# Patient Record
Sex: FEMALE | Race: WHITE | NOT HISPANIC OR LATINO | Employment: FULL TIME | URBAN - METROPOLITAN AREA
[De-identification: names, ages, dates, MRNs, and addresses within clinical notes are randomized per-mention and may not be internally consistent; named-entity substitution may affect disease eponyms.]

---

## 2017-01-30 DIAGNOSIS — D50.9 IRON DEFICIENCY ANEMIA: ICD-10-CM

## 2017-02-10 ENCOUNTER — ALLSCRIPTS OFFICE VISIT (OUTPATIENT)
Dept: OTHER | Facility: OTHER | Age: 47
End: 2017-02-10

## 2017-02-14 ENCOUNTER — LAB CONVERSION - ENCOUNTER (OUTPATIENT)
Dept: OTHER | Facility: OTHER | Age: 47
End: 2017-02-14

## 2017-02-17 ENCOUNTER — LAB CONVERSION - ENCOUNTER (OUTPATIENT)
Dept: OTHER | Facility: OTHER | Age: 47
End: 2017-02-17

## 2017-02-17 LAB
CULTURE RESULT (HISTORICAL): NORMAL
CULTURE RESULT (HISTORICAL): NORMAL
FECAL LEUKOCYTES (HISTORICAL): NORMAL
SHIGA TOXIN TEST (HISTORICAL): NORMAL

## 2017-03-05 ENCOUNTER — GENERIC CONVERSION - ENCOUNTER (OUTPATIENT)
Dept: OTHER | Facility: OTHER | Age: 47
End: 2017-03-05

## 2017-04-15 ENCOUNTER — GENERIC CONVERSION - ENCOUNTER (OUTPATIENT)
Dept: OTHER | Facility: OTHER | Age: 47
End: 2017-04-15

## 2017-04-25 ENCOUNTER — GENERIC CONVERSION - ENCOUNTER (OUTPATIENT)
Dept: OTHER | Facility: OTHER | Age: 47
End: 2017-04-25

## 2017-05-09 ENCOUNTER — GENERIC CONVERSION - ENCOUNTER (OUTPATIENT)
Dept: OTHER | Facility: OTHER | Age: 47
End: 2017-05-09

## 2017-05-16 ENCOUNTER — GENERIC CONVERSION - ENCOUNTER (OUTPATIENT)
Dept: OTHER | Facility: OTHER | Age: 47
End: 2017-05-16

## 2017-05-19 ENCOUNTER — GENERIC CONVERSION - ENCOUNTER (OUTPATIENT)
Dept: OTHER | Facility: OTHER | Age: 47
End: 2017-05-19

## 2017-05-23 ENCOUNTER — GENERIC CONVERSION - ENCOUNTER (OUTPATIENT)
Dept: OTHER | Facility: OTHER | Age: 47
End: 2017-05-23

## 2017-07-27 ENCOUNTER — GENERIC CONVERSION - ENCOUNTER (OUTPATIENT)
Dept: OTHER | Facility: OTHER | Age: 47
End: 2017-07-27

## 2017-08-07 ENCOUNTER — GENERIC CONVERSION - ENCOUNTER (OUTPATIENT)
Dept: OTHER | Facility: OTHER | Age: 47
End: 2017-08-07

## 2017-08-23 ENCOUNTER — GENERIC CONVERSION - ENCOUNTER (OUTPATIENT)
Dept: OTHER | Facility: OTHER | Age: 47
End: 2017-08-23

## 2017-08-30 ENCOUNTER — GENERIC CONVERSION - ENCOUNTER (OUTPATIENT)
Dept: OTHER | Facility: OTHER | Age: 47
End: 2017-08-30

## 2017-09-06 ENCOUNTER — GENERIC CONVERSION - ENCOUNTER (OUTPATIENT)
Dept: OTHER | Facility: OTHER | Age: 47
End: 2017-09-06

## 2017-10-24 ENCOUNTER — GENERIC CONVERSION - ENCOUNTER (OUTPATIENT)
Dept: OTHER | Facility: OTHER | Age: 47
End: 2017-10-24

## 2017-11-08 ENCOUNTER — ALLSCRIPTS OFFICE VISIT (OUTPATIENT)
Dept: OTHER | Facility: OTHER | Age: 47
End: 2017-11-08

## 2017-11-09 NOTE — PROGRESS NOTES
Assessment    1  Conjunctivitis, bacterial (372 39,041 9) (H10 9)    Plan  Conjunctivitis, bacterial    · Tobramycin 0 3 % Ophthalmic Solution; INSTILL 1 DROP INTO AFFECTED EYE(S)4 TIMES DAILY    Discussion/Summary    Patient to start Tobramycin qid, for 5-7 days  RTO as needed or 6 months  Self Referrals: No      Chief Complaint  Pt here for right eye---pasted shut  Onset was last night  Granddaughter has same  no dep--frm ord  dk  since last here  History of Present Illness  HPI: 52year old white female c/o right eye lids pasted shut this morning, has green discharge from right eye, now left eye having similar sx  Grand daughter dx  with pink eye  Saturday son is getting   Vision slightly affected, but has no congestion  Review of Systems   Constitutional: no fever,-- not feeling poorly,-- no chills-- and-- not feeling tired  ENT: no earache,-- no sore throat-- and-- no nasal discharge  Respiratory: no shortness of breath-- and-- no cough  Active Problems  1  Fibromyalgia (729 1) (M79 7)   2  Hypertension (401 9) (I10)   3  Low vitamin D level (268 9) (E55 9)   4  History of No advance directives (V49 89) (Z78 9)   5  Non-seasonal allergic rhinitis due to other allergic trigger (477 8) (J30 89)    Surgical History  Surgical History Reviewed: The surgical history was reviewed and updated today  Social History     · Always uses seat belt   ·    · Former smoker (G75 98) (N17 105)   · Lives with parents   · History of No advance directives (V49 89) (Z78 9)   · No drug use   · Occasional alcohol consumption (V49 89) (Z78 9)  The social history was reviewed and updated today  The social history was reviewed and is unchanged  Family History  Family History Reviewed: The family history was reviewed and updated today  Current Meds   1  Benadryl 25 MG TABS; TAKE 2 TABLET Bedtime; Therapy: 95Jpo2368 to Recorded   2  CeleBREX 200 MG Oral Capsule; 1 BID;  Therapy: 27ALG5528 to Recorded   3  Cetirizine HCl - 10 MG Oral Tablet; Take 1 tablet daily; Therapy: 20OEV0323 to Recorded   4  Cyanocobalamin 1000 MCG/ML Injection Solution; INJECT 1  ML Intramuscular R deltoid monthly; Therapy: 49Ddi1118 to (Last Rx:18Ysv3412)  Requested for: 49Mkn7326 Ordered   5  Dicyclomine HCl - 20 MG Oral Tablet; TAKE 1 TABLET 4 TIMES DAILY; Therapy: 25PEY7712 to (Evaluate:12Mar2017)  Requested for: 53VSL3955; Last Rx:72Wzc6948 Ordered   6  DULoxetine HCl - 60 MG Oral Capsule Delayed Release Particles; TAKE 1 CAPSULE EVERY DAY; Therapy: 85ZYL4029 to (Evaluate:22Apr2018)  Requested for: 24Oct2017; Last Rx:67Pjc9609 Ordered   7  Fluticasone Propionate 50 MCG/ACT Nasal Suspension; USE 1 SPRAY IN EACH NOSTRIL TWICE A DAY FOR 7 DAYS; Therapy: 07HLN9404 to (Evaluate:12Jan2017)  Requested for: 29WIL3025; Last Rx:90Jhk0876 Ordered   8  Gabapentin 600 MG Oral Tablet; take 1 tablet by mouth every day; Therapy: 62Gii2575 to (Maria Isabel Salazar)  Requested for: 16XKQ9532; Last Rx:04Ext6599 Ordered   9  Lisinopril 10 MG Oral Tablet; take 1 tablet every day; Therapy: 93AIB6173 to (Evaluate:22Apr2018)  Requested for: 24Oct2017; Last UH:49KTC9687 Ordered   10  Metoprolol Succinate ER 50 MG Oral Tablet Extended Release 24 Hour; Take 1 tablet  daily; Therapy: 78NUO3592 to (Last Rx:24Oct2017)  Requested for: 24Oct2017 Ordered   11  Montelukast Sodium 10 MG Oral Tablet; take 1 tablet by mouth every day; Therapy: 76KKR0224 to ([id number] [id number])  Requested for: [phone]; Last  Rx:25Oct2017 Ordered   12  Vitamin D (Cholecalciferol) 400 UNIT Oral Tablet Chewable; CHEW 1 TABLET DAILY; Therapy: 96XCW0241 to (Last Rx:67Slw1431)  Requested for: 54Yao6034 Ordered    The medication list was reviewed and updated today  Allergies  1   Bactrim TABS    Vitals   Recorded: 36KZU5360 10:03AM   Temperature 97 F   Respiration 16   Systolic 349   Diastolic 88   Height 5 ft 3 in   Weight 208 lb    BMI Calculated 36 85   BSA Calculated 1 97       Physical Exam   Constitutional  General appearance: No acute distress, well appearing and well nourished  Eyes  Conjunctiva and lids: No swelling, erythema or discharge  Pupils and irises: Equal, round and reactive to light  Ears, Nose, Mouth, and Throat  External inspection of ears and nose: Normal    Otoscopic examination: Abnormal  -- TM erythematous with congestion noted in middle ears  Nasal mucosa, septum, and turbinates: Abnormal  -- Turbinates inflamed  Oropharynx: Normal with no erythema, edema, exudate or lesions  Pulmonary  Respiratory effort: No increased work of breathing or signs of respiratory distress  Auscultation of lungs: Clear to auscultation  Results/Data  PHQ-2 Adult Depression Screening 26UXI9592 10:05AM User, Ahs     Test Name Result Flag Reference   PHQ-2 Adult Depression Score 0       Over the last two weeks, how often have you been bothered by any of the following problems? Little interest or pleasure in doing things: Not at all - 0 Feeling down, depressed, or hopeless: Not at all - 0   PHQ-2 Adult Depression Screening Negative         Future Appointments    Date/Time Provider Specialty Site   12/07/2017 09:00 AM JORGE LUIS Amador   Internal Medicine [facility] INTERNAL MED       Signatures   Electronically signed by : Helena Lazcano [facility]; Nov 8 2017 11:06AM EST                       (Author)    Electronically signed by : JORGE LUIS Del Castillo ; Nov 8 2017 11:37AM EST

## 2017-12-07 ENCOUNTER — ALLSCRIPTS OFFICE VISIT (OUTPATIENT)
Dept: OTHER | Facility: OTHER | Age: 47
End: 2017-12-07

## 2017-12-08 NOTE — PROGRESS NOTES
Assessment    1  Pre-op examination (V72 84) (Z01 818)   2  Knee pain (719 46) (M25 569)   3  Hypertension (401 9) (I10)    Discussion/Summary  Surgical Clearance: She is at a LOW risk from a cardiovascular standpoint at this time without any additional cardiac testing  Reevaluation needed, if she should present with symptoms prior to surgery/procedure  Comments:  patient is cleared for the planned surgery  Labs were reviewed in office with patient and all are acceptable  to take metoprolol evening before the surgery and to take lisinopril in AM day of surgery  Possible side effects of new medications were reviewed with the patient/guardian today  The treatment plan was reviewed with the patient/guardian  The patient/guardian understands and agrees with the treatment plan     Self Referrals: Yes      Chief Complaint  Pt here for pre-op clearance  Had pre-op b/w done on 11/22/17 at [facility]  I will try to get  Having right total knee replacement on 12/20/17 by Dr Nicky Jacob at [facility]  Pt sees dr Steven Villatoro at [facility] and the phone # is [phone]  dk  dr Steven Villatoro since last here--[facility] ortho  History of Present Illness  Pre-Op Visit (Brief): The patient is being seen for a preoperative visit  Surgical Risk Assessment:  Prior Anesthesia: She had prior anesthesia,-- no prior adverse reaction to edidural anesthesia-- and-- no prior adverse reaction to general anesthesia  Review of Systems   Constitutional: No fever, no chills, feels well, no tiredness, no recent weight gain or weight loss  Eyes: No complaints of eye pain, no red eyes, no eyesight problems, no discharge, no dry eyes, no itching of eyes  ENT: no complaints of earache, no loss of hearing, no nose bleeds, no nasal discharge, no sore throat, no hoarseness  Cardiovascular: No complaints of slow heart rate, no fast heart rate, no chest pain, no palpitations, no leg claudication, no lower extremity edema  Respiratory: No complaints of shortness of breath, no wheezing, no cough, no SOB on exertion, no orthopnea, no PND  Gastrointestinal: No complaints of abdominal pain, no constipation, no nausea or vomiting, no diarrhea, no bloody stools  Genitourinary: No complaints of dysuria, no incontinence, no pelvic pain, no dysmenorrhea, no vaginal discharge or bleeding  Musculoskeletal: No complaints of arthralgias, no myalgias, no joint swelling or stiffness, no limb pain or swelling  Integumentary: No complaints of skin rash or lesions, no itching, no skin wounds, no breast pain or lump  Neurological: No complaints of headache, no confusion, no convulsions, no numbness, no dizziness or fainting, no tingling, no limb weakness, no difficulty walking  Psychiatric: Not suicidal, no sleep disturbance, no anxiety or depression, no change in personality, no emotional problems  Endocrine: No complaints of proptosis, no hot flashes, no muscle weakness, no deepening of the voice, no feelings of weakness  Hematologic/Lymphatic: No complaints of swollen glands, no swollen glands in the neck, does not bleed easily, does not bruise easily  Active Problems  1  Conjunctivitis, bacterial (372 39,041 9) (H10 9)   2  Fibromyalgia (729 1) (M79 7)   3  Hypertension (401 9) (I10)   4  Low vitamin D level (268 9) (E55 9)   5  History of No advance directives (V49 89) (Z78 9)   6   Non-seasonal allergic rhinitis due to other allergic trigger (477 8) (J66 89)    Past Medical History   · Denied: History of Mental health problem   · Denied: History of Substance abuse    Surgical History   · History of Arthroscopy Knee Right   · History of Cholecystectomy   · History of Gastric Surgery For Morbid Obesity Gastric Bypass   · History of Hysterectomy   · History of Inguinal Hernia Repair   · History of Primary Repair Of Knee Ligament Cruciate Anterior   · History of Pyeloplasty   · History of Rotator Cuff Repair    Family History  Mother    · Family history of diabetes mellitus (V18 0) (Z83 3)   · Family history of Hypertension, benign  Father    · Denied: Family history of Substance abuse-family    Social History     · Always uses seat belt   ·    · Former smoker (Z89 42) (A76 084)   · Lives with parents   · History of No advance directives (V49 89) (Z78 9)   · No drug use   · Occasional alcohol consumption (V49 89) (Z78 9)    Current Meds   1  Benadryl 25 MG TABS; TAKE 2 TABLET Bedtime; Therapy: 08Mqb8965 to Recorded   2  CeleBREX 200 MG Oral Capsule; 1 BID; Therapy: 27CFX8370 to Recorded   3  Cetirizine HCl - 10 MG Oral Tablet; Take 1 tablet daily; Therapy: 66CAU6008 to Recorded   4  Cyanocobalamin 1000 MCG/ML Injection Solution; INJECT 1  ML Intramuscular R deltoid monthly; Therapy: 38Xat1186 to (Last Rx:02Shd0464)  Requested for: 25Ywh7820 Ordered   5  Dicyclomine HCl - 20 MG Oral Tablet; TAKE 1 TABLET 4 TIMES DAILY; Therapy: 00ZSH1042 to (Evaluate:12Mar2017)  Requested for: 08GDI1074; Last Rx:45Jgn4160 Ordered   6  DULoxetine HCl - 60 MG Oral Capsule Delayed Release Particles; TAKE 1 CAPSULE EVERY DAY; Therapy: 74XBO4663 to (Evaluate:22Apr2018)  Requested for: 24Oct2017; Last Rx:24Oct2017 Ordered   7  Fluticasone Propionate 50 MCG/ACT Nasal Suspension; USE 1 SPRAY IN EACH NOSTRIL TWICE A DAY FOR 7 DAYS; Therapy: 11XTS5003 to (Evaluate:12Jan2017)  Requested for: 21GKD1628; Last Rx:13Nov2016 Ordered   8  Gabapentin 600 MG Oral Tablet; take 1 tablet by mouth every day; Therapy: 29Cyl8747 to (Mabel Matthew)  Requested for: 03JFF2201; Last Rx:09Oct2017 Ordered   9  Lisinopril 10 MG Oral Tablet; take 1 tablet every day; Therapy: 67DXP9784 to (Evaluate:22Apr2018)  Requested for: 38Wuw1647; Last SQ:14IRW3320 Ordered   10  Metoprolol Succinate ER 50 MG Oral Tablet Extended Release 24 Hour; Take 1 tablet  daily; Therapy: 28PDX8460 to (Last Rx:77Xwq0021)  Requested for: 24Oct2017 Ordered   11   Montelukast Sodium 10 MG Oral Tablet; take 1 tablet by mouth every day; Therapy: 59TDI1905 to (0487 [id number] 23 66)  Requested for: ; Last  Rx:25Oct2017 Ordered   12  Vitamin D (Cholecalciferol) 400 UNIT Oral Tablet Chewable; CHEW 1 TABLET DAILY; Therapy: 58Qeo1561 to (Last Rx:36Zhe8416)  Requested for: 03Cob0331 Ordered    Allergies  1  Bactrim TABS    Vitals   Recorded: 61Nit4133 08:44AM   Heart Rate 79, R DP   Respiration 16   Systolic 105, LUE, Sitting   Diastolic 78, LUE, Sitting   BP CUFF SIZE Large   Height 5 ft 3 in   Weight 210 lb    BMI Calculated 37 2   BSA Calculated 1 97       Physical Exam   Constitutional  General appearance: No acute distress, well appearing and well nourished  Eyes  Conjunctiva and lids: No swelling, erythema or discharge  Pupils and irises: Equal, round, reactive to light  Ophthalmoscopic examination: Normal fundi and optic discs  Ears, Nose, Mouth, and Throat  External inspection of ears and nose: Normal    Otoscopic examination: Tympanic membranes translucent with normal light reflex  Canals patent without erythema  Hearing: Normal    Nasal mucosa, septum, and turbinates: Normal without edema or erythema  Lips, teeth, and gums: Normal, good dentition  Oropharynx: Normal with no erythema, edema, exudate or lesions  Neck  Neck: Supple, symmetric, trachea midline, no masses  Thyroid: Normal, no thyromegaly  Pulmonary  Respiratory effort: No increased work of breathing or signs of respiratory distress  Percussion of chest: Normal    Palpation of chest: Normal    Auscultation of lungs: Clear to auscultation  Cardiovascular  Palpation of heart: Normal PMI, no thrills  Auscultation of heart: Normal rate and rhythm, normal S1 and S2, no murmurs  Carotid pulses: 2+ bilaterally  Abdominal aorta: Normal    Femoral pulses: 2+ bilaterally  Pedal pulses: 2+ bilaterally  Examination of extremities for edema and/or varicosities: Normal    Chest  Breasts: Normal, no dimpling or skin changes appreciated  Palpation of breasts and axillae: Normal, no masses palpated  Abdomen  Abdomen: Non-tender, no masses  Liver and spleen: No hepatomegaly or splenomegaly  Examination for hernias: No hernia appreciated  Anus, perineum, and rectum: Normal sphincter tone, no masses, no prolapse  Stool sample for occult blood: Negative  Genitourinary  External genitalia and vagina: Normal, no lesions appreciated  Urethra: Normal, no discharge  Bladder: Not distended, no tenderness  Cervix: Normal, no lesions  Uterus: Normal size, no tenderness, no masses  Adnexa/Parametria: Normal, no masses or tenderness  Lymphatic  Palpation of lymph nodes in neck: No lymphadenopathy  Palpation of lymph nodes in axillae: No lymphadenopathy  Palpation of lymph nodes in groin: No lymphadenopathy  Palpation of lymph nodes in other areas: No lymphadenopathy  Musculoskeletal  Gait and station: Normal    Digits and nails: Normal without clubbing or cyanosis  Joints, bones, and muscles: Normal    Range of motion: Normal    Stability: Normal    Muscle strength/tone: Normal    Skin  Skin and subcutaneous tissue: Normal without rashes or lesions  Palpation of skin and subcutaneous tissue: Normal turgor  Neurologic  Cranial nerves: Cranial nerves II-XII intact  Reflexes: 2+ and symmetric  Sensation: No sensory loss  Psychiatric  Judgment and insight: Normal    Orientation to person, place, and time: Normal    Recent and remote memory: Intact  Mood and affect: Normal        End of Encounter Meds    1  CeleBREX 200 MG Oral Capsule (Celecoxib); 1 BID; Therapy: 12RKJ0886 to Recorded   2  Gabapentin 600 MG Oral Tablet; take 1 tablet by mouth every day; Therapy: 60Gyd7420 to (Evaluate:89Cqu3215)  Requested for: 18UUZ8954; Last Rx:09Oct2017 Ordered    3  DULoxetine HCl - 60 MG Oral Capsule Delayed Release Particles; TAKE 1 CAPSULE EVERY DAY;  Therapy: 35SFE7720 to (Evaluate:22Apr2018)  Requested for: [phone]; Last Rx: 21QRR3121 Ordered    4  Lisinopril 10 MG Oral Tablet; take 1 tablet every day; Therapy: 62QNE9590 to (Evaluate:22Apr2018)  Requested for: 24Oct2017; Last LN:65VOC2520 Ordered   5  Metoprolol Succinate ER 50 MG Oral Tablet Extended Release 24 Hour; Take 1 tablet daily; Therapy: 31SYI8627 to (Last Rx:42Hng4795)  Requested for: 95Gng9455 Ordered    6  Vitamin D (Cholecalciferol) 400 UNIT Oral Tablet Chewable; CHEW 1 TABLET DAILY; Therapy: 68Mhw7500 to (Last Rx:31Cbo1256)  Requested for: 82Tbw6682 Ordered    7  Benadryl 25 MG TABS; TAKE 2 TABLET Bedtime; Therapy: 08LXW1152 to Recorded   8  Cetirizine HCl - 10 MG Oral Tablet; Take 1 tablet daily; Therapy: 12VWA3521 to Recorded   9  Montelukast Sodium 10 MG Oral Tablet (Singulair); take 1 tablet by mouth every day; Therapy: 27ASB1241 to ([phone])  Requested for: [phone]; Last SA:68FJE3897 Ordered    10  Fluticasone Propionate 50 MCG/ACT Nasal Suspension; USE 1 SPRAY IN EACH  NOSTRIL TWICE A DAY FOR 7 DAYS; Therapy: 72OBW9827 to (Evaluate:12Jan2017)  Requested for: 10SDP7741; Last  Rx:71Rck4602 Ordered    11  Dicyclomine HCl - 20 MG Oral Tablet; TAKE 1 TABLET 4 TIMES DAILY; Therapy: 55DLS9301 to (Evaluate:12Mar2017)  Requested for: 53HCW8069; Last  Rx:99Bip6846 Ordered    12  Cyanocobalamin 1000 MCG/ML Injection Solution; INJECT 1  ML Intramuscular R  deltoid monthly;   Therapy: 65JEL8099 to (Last Rx:67Sft4595)  Requested for: 70Qdm8099 Ordered    Signatures   Electronically signed by : JORGE LUIS Haskins ; Dec  7 2017  9:35AM EST                       (Author)

## 2017-12-20 ENCOUNTER — GENERIC CONVERSION - ENCOUNTER (OUTPATIENT)
Dept: FAMILY MEDICINE CLINIC | Facility: HOSPITAL | Age: 47
End: 2017-12-20

## 2018-01-08 ENCOUNTER — GENERIC CONVERSION - ENCOUNTER (OUTPATIENT)
Dept: FAMILY MEDICINE CLINIC | Facility: HOSPITAL | Age: 48
End: 2018-01-08

## 2018-01-14 VITALS
DIASTOLIC BLOOD PRESSURE: 82 MMHG | TEMPERATURE: 96.8 F | HEIGHT: 63 IN | BODY MASS INDEX: 33.49 KG/M2 | SYSTOLIC BLOOD PRESSURE: 132 MMHG | WEIGHT: 189 LBS

## 2018-01-15 VITALS
WEIGHT: 208 LBS | RESPIRATION RATE: 16 BRPM | TEMPERATURE: 97 F | DIASTOLIC BLOOD PRESSURE: 88 MMHG | BODY MASS INDEX: 36.86 KG/M2 | SYSTOLIC BLOOD PRESSURE: 158 MMHG | HEIGHT: 63 IN

## 2018-01-23 VITALS
HEART RATE: 79 BPM | SYSTOLIC BLOOD PRESSURE: 120 MMHG | RESPIRATION RATE: 16 BRPM | WEIGHT: 210 LBS | HEIGHT: 63 IN | BODY MASS INDEX: 37.21 KG/M2 | DIASTOLIC BLOOD PRESSURE: 78 MMHG

## 2018-01-23 NOTE — CONSULTS
Chief Complaint  Pt here for pre-op clearance  Had pre-op b/w done on 11/22/17 at [facility]  I will try to get  Having right total knee replacement on 12/20/17 by Dr Iliana Baez at [facility]  Pt sees dr Chidi Beckett at [facility] and the phone # is [phone]  dk     saw dr Chidi Beckett since last here--[facility]  History of Present Illness  Pre-Op Visit (Brief): The patient is being seen for a preoperative visit  Surgical Risk Assessment:   Prior Anesthesia: She had prior anesthesia, no prior adverse reaction to edidural anesthesia and no prior adverse reaction to general anesthesia  Review of Systems    Constitutional: No fever, no chills, feels well, no tiredness, no recent weight gain or weight loss  Eyes: No complaints of eye pain, no red eyes, no eyesight problems, no discharge, no dry eyes, no itching of eyes  ENT: no complaints of earache, no loss of hearing, no nose bleeds, no nasal discharge, no sore throat, no hoarseness  Cardiovascular: No complaints of slow heart rate, no fast heart rate, no chest pain, no palpitations, no leg claudication, no lower extremity edema  Respiratory: No complaints of shortness of breath, no wheezing, no cough, no SOB on exertion, no orthopnea, no PND  Gastrointestinal: No complaints of abdominal pain, no constipation, no nausea or vomiting, no diarrhea, no bloody stools  Genitourinary: No complaints of dysuria, no incontinence, no pelvic pain, no dysmenorrhea, no vaginal discharge or bleeding  Musculoskeletal: No complaints of arthralgias, no myalgias, no joint swelling or stiffness, no limb pain or swelling  Integumentary: No complaints of skin rash or lesions, no itching, no skin wounds, no breast pain or lump  Neurological: No complaints of headache, no confusion, no convulsions, no numbness, no dizziness or fainting, no tingling, no limb weakness, no difficulty walking     Psychiatric: Not suicidal, no sleep disturbance, no anxiety or depression, no change in personality, no emotional problems  Endocrine: No complaints of proptosis, no hot flashes, no muscle weakness, no deepening of the voice, no feelings of weakness  Hematologic/Lymphatic: No complaints of swollen glands, no swollen glands in the neck, does not bleed easily, does not bruise easily  Active Problems    1  Conjunctivitis, bacterial (372 39,041 9) (H10 9)   2  Fibromyalgia (729 1) (M79 7)   3  Hypertension (401 9) (I10)   4  Low vitamin D level (268 9) (E55 9)   5  History of No advance directives (V49 89) (Z78 9)   6  Non-seasonal allergic rhinitis due to other allergic trigger (477 8) (J30 89)    Past Medical History    · Denied: History of Mental health problem   · Denied: History of Substance abuse    Surgical History    · History of Arthroscopy Knee Right   · History of Cholecystectomy   · History of Gastric Surgery For Morbid Obesity Gastric Bypass   · History of Hysterectomy   · History of Inguinal Hernia Repair   · History of Primary Repair Of Knee Ligament Cruciate Anterior   · History of Pyeloplasty   · History of Rotator Cuff Repair    Family History    · Family history of diabetes mellitus (V18 0) (Z83 3)   · Family history of Hypertension, benign    · Denied: Family history of Substance abuse-family    Social History    · Always uses seat belt   ·    · Former smoker (V15 82) (U92 433)   · Lives with parents   · History of No advance directives (V49 89) (Z78 9)   · No drug use   · Occasional alcohol consumption (V49 89) (Z78 9)    Current Meds   1  Benadryl 25 MG TABS; TAKE 2 TABLET Bedtime; Therapy: 41Jip9448 to Recorded   2  CeleBREX 200 MG Oral Capsule; 1 BID; Therapy: 34EBI2475 to Recorded   3  Cetirizine HCl - 10 MG Oral Tablet; Take 1 tablet daily; Therapy: 17EFQ8825 to Recorded   4  Cyanocobalamin 1000 MCG/ML Injection Solution; INJECT 1  ML Intramuscular R deltoid   monthly;    Therapy: 47Lsq1050 to (Last Rx:19Sep2016) Requested for: 93BNW5654 Ordered   5  Dicyclomine HCl - 20 MG Oral Tablet; TAKE 1 TABLET 4 TIMES DAILY; Therapy: 82YNS2466 to (Evaluate:12Mar2017)  Requested for: 24TWP0972; Last   Rx:27Beb8602 Ordered   6  DULoxetine HCl - 60 MG Oral Capsule Delayed Release Particles; TAKE 1 CAPSULE   EVERY DAY; Therapy: 27CID0827 to (Evaluate:22Apr2018)  Requested for: 46Ztx6518; Last   Rx:60Eif6196 Ordered   7  Fluticasone Propionate 50 MCG/ACT Nasal Suspension; USE 1 SPRAY IN EACH   NOSTRIL TWICE A DAY FOR 7 DAYS; Therapy: 41QRA9456 to (Evaluate:12Jan2017)  Requested for: 76NUS8396; Last   Rx:18Lmu2884 Ordered   8  Gabapentin 600 MG Oral Tablet; take 1 tablet by mouth every day; Therapy: 88Czi4276 to (Devante Goldstein)  Requested for: 13SVY5813; Last   Rx:13Cmg8428 Ordered   9  Lisinopril 10 MG Oral Tablet; take 1 tablet every day; Therapy: 95YAT9762 to (Evaluate:22Apr2018)  Requested for: 24Oct2017; Last   UX:06QOO8395 Ordered   10  Metoprolol Succinate ER 50 MG Oral Tablet Extended Release 24 Hour; Take 1 tablet    daily; Therapy: 26WSP8120 to (Last Rx:84Emr0603)  Requested for: 24Oct2017 Ordered   11  Montelukast Sodium 10 MG Oral Tablet; take 1 tablet by mouth every day; Therapy: 31WBG7881 to (Melony Borges)  Requested for: ; Last    Rx:28Ouw1439 Ordered   12  Vitamin D (Cholecalciferol) 400 UNIT Oral Tablet Chewable; CHEW 1 TABLET DAILY; Therapy: 76Jki4636 to (Last Rx:95Kut1216)  Requested for: 27Suf8339 Ordered    Allergies    1  Bactrim TABS    Vitals  Signs    Heart Rate: 79, R DP  Respiration: 16  Systolic: 227, LUE, Sitting  Diastolic: 78, LUE, Sitting  BP Cuff Size: Large  Height: 5 ft 3 in  Weight: 210 lb   BMI Calculated: 37 2  BSA Calculated: 1 97    Physical Exam    Constitutional   General appearance: No acute distress, well appearing and well nourished  Eyes   Conjunctiva and lids: No swelling, erythema or discharge  Pupils and irises: Equal, round, reactive to light  Ophthalmoscopic examination: Normal fundi and optic discs  Ears, Nose, Mouth, and Throat   External inspection of ears and nose: Normal     Otoscopic examination: Tympanic membranes translucent with normal light reflex  Canals patent without erythema  Hearing: Normal     Nasal mucosa, septum, and turbinates: Normal without edema or erythema  Lips, teeth, and gums: Normal, good dentition  Oropharynx: Normal with no erythema, edema, exudate or lesions  Neck   Neck: Supple, symmetric, trachea midline, no masses  Thyroid: Normal, no thyromegaly  Pulmonary   Respiratory effort: No increased work of breathing or signs of respiratory distress  Percussion of chest: Normal     Palpation of chest: Normal     Auscultation of lungs: Clear to auscultation  Cardiovascular   Palpation of heart: Normal PMI, no thrills  Auscultation of heart: Normal rate and rhythm, normal S1 and S2, no murmurs  Carotid pulses: 2+ bilaterally  Abdominal aorta: Normal     Femoral pulses: 2+ bilaterally  Pedal pulses: 2+ bilaterally  Examination of extremities for edema and/or varicosities: Normal     Chest   Breasts: Normal, no dimpling or skin changes appreciated  Palpation of breasts and axillae: Normal, no masses palpated  Abdomen   Abdomen: Non-tender, no masses  Liver and spleen: No hepatomegaly or splenomegaly  Examination for hernias: No hernia appreciated  Anus, perineum, and rectum: Normal sphincter tone, no masses, no prolapse  Stool sample for occult blood: Negative  Genitourinary   External genitalia and vagina: Normal, no lesions appreciated  Urethra: Normal, no discharge  Bladder: Not distended, no tenderness  Cervix: Normal, no lesions  Uterus: Normal size, no tenderness, no masses  Adnexa/Parametria: Normal, no masses or tenderness  Lymphatic   Palpation of lymph nodes in neck: No lymphadenopathy      Palpation of lymph nodes in axillae: No lymphadenopathy  Palpation of lymph nodes in groin: No lymphadenopathy  Palpation of lymph nodes in other areas: No lymphadenopathy  Musculoskeletal   Gait and station: Normal     Digits and nails: Normal without clubbing or cyanosis  Joints, bones, and muscles: Normal     Range of motion: Normal     Stability: Normal     Muscle strength/tone: Normal     Skin   Skin and subcutaneous tissue: Normal without rashes or lesions  Palpation of skin and subcutaneous tissue: Normal turgor  Neurologic   Cranial nerves: Cranial nerves II-XII intact  Reflexes: 2+ and symmetric  Sensation: No sensory loss  Psychiatric   Judgment and insight: Normal     Orientation to person, place, and time: Normal     Recent and remote memory: Intact  Mood and affect: Normal        Assessment    1  Pre-op examination (V72 84) (Z01 818)   2  Knee pain (719 46) (M25 569)   3  Hypertension (401 9) (I10)    Discussion/Summary  Surgical Clearance: She is at a LOW risk from a cardiovascular standpoint at this time without any additional cardiac testing  Reevaluation needed, if she should present with symptoms prior to surgery/procedure  Comments:  patient is cleared for the planned surgery  Labs were reviewed in office with patient and all are acceptable    advised to take metoprolol evening before the surgery and to take lisinopril in AM day of surgery  Possible side effects of new medications were reviewed with the patient/guardian today  The treatment plan was reviewed with the patient/guardian  The patient/guardian understands and agrees with the treatment plan     Self Referrals: Yes      End of Encounter Meds    1  CeleBREX 200 MG Oral Capsule (Celecoxib); 1 BID; Therapy: 44TMN6090 to Recorded   2  Gabapentin 600 MG Oral Tablet; take 1 tablet by mouth every day; Therapy: 68Gyl9564 to (Evaluate:43Sfa9040)  Requested for: 51PVD1053; Last   Rx:09Oct2017 Ordered    3   DULoxetine HCl - 60 MG Oral Capsule Delayed Release Particles; TAKE 1 CAPSULE   EVERY DAY; Therapy: 98UQI4331 to (Evaluate:22Apr2018)  Requested for: 24Oct2017; Last   Rx:24Oct2017 Ordered    4  Lisinopril 10 MG Oral Tablet; take 1 tablet every day; Therapy: 33JSR4905 to (Evaluate:22Apr2018)  Requested for: 24Oct2017; Last   IS:97GUO4854 Ordered   5  Metoprolol Succinate ER 50 MG Oral Tablet Extended Release 24 Hour; Take 1 tablet   daily; Therapy: 15GRW5275 to (Last Rx:24Oct2017)  Requested for: 24Oct2017 Ordered    6  Vitamin D (Cholecalciferol) 400 UNIT Oral Tablet Chewable; CHEW 1 TABLET DAILY; Therapy: 42Akp6693 to (Last Rx:95Hlb0996)  Requested for: 17Djz4904 Ordered    7  Benadryl 25 MG TABS; TAKE 2 TABLET Bedtime; Therapy: 22CKS2708 to Recorded   8  Cetirizine HCl - 10 MG Oral Tablet; Take 1 tablet daily; Therapy: 97XQK1941 to Recorded   9  Montelukast Sodium 10 MG Oral Tablet (Singulair); take 1 tablet by mouth every day; Therapy: 62XFN1072 to (Jessika Patience)  Requested for: [phone]; Last   OZ:38ZUD8948 Ordered    10  Fluticasone Propionate 50 MCG/ACT Nasal Suspension; USE 1 SPRAY IN EACH    NOSTRIL TWICE A DAY FOR 7 DAYS; Therapy: 71GJH6313 to (Evaluate:12Jan2017)  Requested for: 03MDT5121; Last    Rx:08Uik5721 Ordered    11  Dicyclomine HCl - 20 MG Oral Tablet; TAKE 1 TABLET 4 TIMES DAILY; Therapy: 37WGY4149 to (Evaluate:12Mar2017)  Requested for: 77SWM7985; Last    Rx:30Vgb8986 Ordered    12  Cyanocobalamin 1000 MCG/ML Injection Solution; INJECT 1  ML Intramuscular R deltoid    monthly;     Therapy: 10IEP3053 to (Last Rx:91Zqo7763)  Requested for: 73Ldi2624 Ordered    Signatures   Electronically signed by : JORGE LUIS Vigil ; Dec  7 2017  9:35AM EST                       (Author)

## 2018-04-18 PROBLEM — Z91.09 ENVIRONMENTAL ALLERGIES: Status: ACTIVE | Noted: 2018-04-18

## 2018-04-18 RX ORDER — LISINOPRIL 10 MG/1
1 TABLET ORAL DAILY
COMMUNITY
Start: 2016-09-07 | End: 2018-08-03 | Stop reason: SDUPTHER

## 2018-04-18 RX ORDER — MONTELUKAST SODIUM 10 MG/1
1 TABLET ORAL DAILY
COMMUNITY
Start: 2016-09-07 | End: 2018-04-28 | Stop reason: SDUPTHER

## 2018-04-18 RX ORDER — METOPROLOL SUCCINATE 50 MG/1
1 TABLET, EXTENDED RELEASE ORAL DAILY
COMMUNITY
Start: 2016-09-07 | End: 2018-05-21 | Stop reason: SDUPTHER

## 2018-04-19 ENCOUNTER — OFFICE VISIT (OUTPATIENT)
Dept: FAMILY MEDICINE CLINIC | Facility: HOSPITAL | Age: 48
End: 2018-04-19
Payer: COMMERCIAL

## 2018-04-19 VITALS
DIASTOLIC BLOOD PRESSURE: 80 MMHG | OXYGEN SATURATION: 98 % | WEIGHT: 218 LBS | BODY MASS INDEX: 38.62 KG/M2 | HEIGHT: 63 IN | HEART RATE: 83 BPM | SYSTOLIC BLOOD PRESSURE: 136 MMHG

## 2018-04-19 DIAGNOSIS — M79.7 FIBROMYALGIA: ICD-10-CM

## 2018-04-19 DIAGNOSIS — M54.2 NECK PAIN: ICD-10-CM

## 2018-04-19 DIAGNOSIS — R53.82 CHRONIC FATIGUE: ICD-10-CM

## 2018-04-19 DIAGNOSIS — Z00.00 HEALTH CARE MAINTENANCE: ICD-10-CM

## 2018-04-19 DIAGNOSIS — Z91.09 ENVIRONMENTAL ALLERGIES: ICD-10-CM

## 2018-04-19 DIAGNOSIS — I10 ESSENTIAL HYPERTENSION: Primary | ICD-10-CM

## 2018-04-19 PROCEDURE — 99214 OFFICE O/P EST MOD 30 MIN: CPT | Performed by: INTERNAL MEDICINE

## 2018-04-19 RX ORDER — CELECOXIB 200 MG/1
200 CAPSULE ORAL EVERY EVENING
COMMUNITY
Start: 2018-04-12 | End: 2019-10-29

## 2018-04-19 RX ORDER — GABAPENTIN 100 MG/1
100 CAPSULE ORAL
Qty: 30 CAPSULE | Refills: 4 | Status: SHIPPED | OUTPATIENT
Start: 2018-04-19 | End: 2018-06-07 | Stop reason: SDUPTHER

## 2018-04-19 RX ORDER — GABAPENTIN 300 MG/1
300 CAPSULE ORAL
Qty: 60 CAPSULE | Refills: 0 | Status: SHIPPED | OUTPATIENT
Start: 2018-04-19 | End: 2018-07-05 | Stop reason: ALTCHOICE

## 2018-04-19 RX ORDER — DULOXETIN HYDROCHLORIDE 60 MG/1
60 CAPSULE, DELAYED RELEASE ORAL
COMMUNITY
Start: 2012-02-16 | End: 2018-05-21 | Stop reason: SDUPTHER

## 2018-04-19 RX ORDER — GABAPENTIN 600 MG/1
TABLET ORAL
COMMUNITY
Start: 2018-01-24 | End: 2018-04-19 | Stop reason: CLARIF

## 2018-04-19 RX ORDER — DULOXETIN HYDROCHLORIDE 30 MG/1
30 CAPSULE, DELAYED RELEASE ORAL DAILY
Qty: 30 CAPSULE | Refills: 5 | Status: SHIPPED | OUTPATIENT
Start: 2018-04-19 | End: 2018-10-08 | Stop reason: SDUPTHER

## 2018-04-19 NOTE — PROGRESS NOTES
Assessment/Plan:    Hypertension  Stable on meds    Environmental allergies  On singulair    Neck pain  Present for 1 month or so  Feels a lump under the neck  Also c/o a lot of fatigue    Fibromyalgia  Sx are not a problem right now,  Wants to discuss taper of gabapentin    Patient Active Problem List   Diagnosis    Fibromyalgia    Hypertension    Environmental allergies    Neck pain     Orders Placed This Encounter   Procedures    Mammo diagnostic bilateral w cad    US thyroid    Comprehensive metabolic panel    Lipid Panel with Direct LDL reflex    TSH, 3rd generation            Diagnoses and all orders for this visit:    Essential hypertension  -     Lipid Panel with Direct LDL reflex; Future    Neck pain  -     gabapentin (NEURONTIN) 300 mg capsule; Take 1 capsule (300 mg total) by mouth daily at bedtime Take with 100 mg tablet as instructed  -     gabapentin (NEURONTIN) 100 mg capsule; Take 1 capsule (100 mg total) by mouth daily at bedtime Take with 300 mg as instructed  -     US thyroid; Future    Environmental allergies    Fibromyalgia  -     DULoxetine (CYMBALTA) 30 mg delayed release capsule; Take 1 capsule (30 mg total) by mouth daily for 30 days With 60 mg capsule for total dose of 90 mg    Chronic fatigue  -     Comprehensive metabolic panel; Future  -     TSH, 3rd generation; Future    Health care maintenance  -     Mammo diagnostic bilateral w cad; Future    Other orders  -     lisinopril (ZESTRIL) 10 mg tablet; Take 1 tablet by mouth daily  -     metoprolol succinate (TOPROL-XL) 50 mg 24 hr tablet; Take 1 tablet by mouth daily  -     montelukast (SINGULAIR) 10 mg tablet; Take 1 tablet by mouth daily  -     celecoxib (CeleBREX) 200 mg capsule;   -     DULoxetine (CYMBALTA) 60 mg delayed release capsule; Take 60 mg by mouth  -     Discontinue: gabapentin (NEURONTIN) 600 MG tablet;   -     ERGOCALCIFEROL IM;           Subjective:      Patient ID: Amarilys Ramirez is a 52 y o  female      Follow up of chronic as described and several new problems  Lump under neck, worsening depression, fatigue,  Swallow problem           The following portions of the patient's history were reviewed and updated as appropriate: allergies, current medications, past family history, past medical history, past social history, past surgical history and problem list     Review of Systems   Constitutional: Positive for fatigue  Objective:      Current Outpatient Prescriptions:     celecoxib (CeleBREX) 200 mg capsule, , Disp: , Rfl:     DULoxetine (CYMBALTA) 60 mg delayed release capsule, Take 60 mg by mouth, Disp: , Rfl:     ERGOCALCIFEROL IM, , Disp: , Rfl:     lisinopril (ZESTRIL) 10 mg tablet, Take 1 tablet by mouth daily, Disp: , Rfl:     metoprolol succinate (TOPROL-XL) 50 mg 24 hr tablet, Take 1 tablet by mouth daily, Disp: , Rfl:     montelukast (SINGULAIR) 10 mg tablet, Take 1 tablet by mouth daily, Disp: , Rfl:     DULoxetine (CYMBALTA) 30 mg delayed release capsule, Take 1 capsule (30 mg total) by mouth daily for 30 days With 60 mg capsule for total dose of 90 mg, Disp: 30 capsule, Rfl: 5    gabapentin (NEURONTIN) 100 mg capsule, Take 1 capsule (100 mg total) by mouth daily at bedtime Take with 300 mg as instructed , Disp: 30 capsule, Rfl: 4    gabapentin (NEURONTIN) 300 mg capsule, Take 1 capsule (300 mg total) by mouth daily at bedtime Take with 100 mg tablet as instructed, Disp: 60 capsule, Rfl: 0     Physical Exam   Constitutional: She is oriented to person, place, and time  She appears well-developed and well-nourished  Eyes: Pupils are equal, round, and reactive to light  Neck: Normal range of motion  Neck supple  No thyromegaly present  Cardiovascular: Normal rate, regular rhythm, normal heart sounds and intact distal pulses  No murmur heard  Pulmonary/Chest: Effort normal and breath sounds normal  She has no wheezes  She has no rales  Abdominal: Soft   Bowel sounds are normal  There is no tenderness  Musculoskeletal: Normal range of motion  She exhibits no edema, tenderness or deformity  Lymphadenopathy:     She has no cervical adenopathy  Neurological: She is alert and oriented to person, place, and time  She has normal reflexes  Skin: Skin is warm and dry  Psychiatric: She has a normal mood and affect  Vitals reviewed

## 2018-04-19 NOTE — PATIENT INSTRUCTIONS
Taper of gabapentin,  400 mg for 1 month,  300 mg for 1 mo , then 200 mg for 1 month, 100 mg for 1 month, then stop  Increase cymbalta to 90 mg daily  Labs and mammo ordered to be done as early as possible    US of thyroid    You were seen for a follow up of chronic medical problems today  Be sure to make any changes to your medication as discussed by your doctor  If blood tests or other testing was ordered, be sure to obtain this at the time requested by the doctor  Our office will call you with the results of your tests once they arrive in our office

## 2018-04-20 DIAGNOSIS — G62.9 NEUROPATHY: Primary | ICD-10-CM

## 2018-04-20 RX ORDER — GABAPENTIN 600 MG/1
TABLET ORAL
Qty: 30 TABLET | Refills: 3 | Status: SHIPPED | OUTPATIENT
Start: 2018-04-20 | End: 2018-06-07 | Stop reason: CLARIF

## 2018-04-25 ENCOUNTER — TELEPHONE (OUTPATIENT)
Dept: FAMILY MEDICINE CLINIC | Facility: HOSPITAL | Age: 48
End: 2018-04-25

## 2018-04-25 ENCOUNTER — HOSPITAL ENCOUNTER (OUTPATIENT)
Dept: ULTRASOUND IMAGING | Facility: HOSPITAL | Age: 48
Discharge: HOME | End: 2018-04-25
Attending: INTERNAL MEDICINE
Payer: COMMERCIAL

## 2018-04-25 DIAGNOSIS — M54.2 NECK PAIN: ICD-10-CM

## 2018-04-25 LAB
ALBUMIN SERPL-MCNC: 4.2 G/DL (ref 3.6–5.1)
ALBUMIN/GLOB SERPL: 1.4 (CALC) (ref 1–2.5)
ALP SERPL-CCNC: 140 U/L (ref 33–115)
ALT SERPL-CCNC: 17 U/L (ref 6–29)
AST SERPL-CCNC: 22 U/L (ref 10–35)
BILIRUB SERPL-MCNC: 0.6 MG/DL (ref 0.2–1.2)
BUN SERPL-MCNC: 12 MG/DL (ref 7–25)
BUN/CREAT SERPL: ABNORMAL (CALC) (ref 6–22)
CALCIUM SERPL-MCNC: 9.6 MG/DL (ref 8.6–10.2)
CHLORIDE SERPL-SCNC: 100 MMOL/L (ref 98–110)
CHOLEST SERPL-MCNC: 195 MG/DL
CHOLEST/HDLC SERPL: 2.8 (CALC)
CO2 SERPL-SCNC: 31 MMOL/L (ref 20–31)
CREAT SERPL-MCNC: 0.78 MG/DL (ref 0.5–1.1)
GLOBULIN SER CALC-MCNC: 2.9 G/DL (CALC) (ref 1.9–3.7)
GLUCOSE SERPL-MCNC: 95 MG/DL (ref 65–99)
HDLC SERPL-MCNC: 69 MG/DL
LDLC SERPL CALC-MCNC: 102 MG/DL (CALC)
NONHDLC SERPL-MCNC: 126 MG/DL (CALC)
POTASSIUM SERPL-SCNC: 4.7 MMOL/L (ref 3.5–5.3)
PROT SERPL-MCNC: 7.1 G/DL (ref 6.1–8.1)
SL AMB EGFR AFRICAN AMERICAN: 105 ML/MIN/1.73M2
SL AMB EGFR NON AFRICAN AMERICAN: 91 ML/MIN/1.73M2
SODIUM SERPL-SCNC: 136 MMOL/L (ref 135–146)
TRIGL SERPL-MCNC: 143 MG/DL
TSH SERPL-ACNC: 1.56 MIU/L

## 2018-04-25 PROCEDURE — 76536 US EXAM OF HEAD AND NECK: CPT

## 2018-04-26 NOTE — PROGRESS NOTES
I am not sure whether patient was notified of my message from yesterday    Please be sure she gets the message

## 2018-04-27 ENCOUNTER — TELEPHONE (OUTPATIENT)
Dept: FAMILY MEDICINE CLINIC | Facility: HOSPITAL | Age: 48
End: 2018-04-27

## 2018-04-27 DIAGNOSIS — R59.0 ENLARGED LYMPH NODE IN NECK: Primary | ICD-10-CM

## 2018-04-27 NOTE — TELEPHONE ENCOUNTER
Left a detailed message for pt her labs are normal however I asked that she call me back to go over the results of CT DD      ----- Message from Baltazar Mccall MD sent at 4/25/2018 10:09 AM EDT -----  Labs are normal   Call pt  To inform

## 2018-04-27 NOTE — TELEPHONE ENCOUNTER
Pt called back she is aware of results and will f/u with CT of neck I put order in system DD      ----- Message from Kate Amaral MD sent at 4/25/2018  4:34 PM EDT -----  Call pt  No evidence of thyroid nodule but there is a large lymph node  Radiology recommending a CT of neck to get a better evaluation    Please order this

## 2018-04-28 DIAGNOSIS — Z88.9 H/O MULTIPLE ALLERGIES: Primary | ICD-10-CM

## 2018-04-28 RX ORDER — MONTELUKAST SODIUM 10 MG/1
TABLET ORAL
Qty: 30 TABLET | Refills: 3 | Status: SHIPPED | OUTPATIENT
Start: 2018-04-28 | End: 2018-08-23 | Stop reason: SDUPTHER

## 2018-05-01 ENCOUNTER — TELEPHONE (OUTPATIENT)
Dept: FAMILY MEDICINE CLINIC | Facility: HOSPITAL | Age: 48
End: 2018-05-01

## 2018-05-01 DIAGNOSIS — Z12.39 SCREENING FOR BREAST CANCER: Primary | ICD-10-CM

## 2018-05-03 ENCOUNTER — HOSPITAL ENCOUNTER (OUTPATIENT)
Dept: CT IMAGING | Facility: HOSPITAL | Age: 48
Discharge: HOME | End: 2018-05-03
Attending: INTERNAL MEDICINE
Payer: COMMERCIAL

## 2018-05-03 ENCOUNTER — HOSPITAL ENCOUNTER (OUTPATIENT)
Dept: MAMMOGRAPHY | Facility: CLINIC | Age: 48
Discharge: HOME | End: 2018-05-03
Payer: COMMERCIAL

## 2018-05-03 DIAGNOSIS — Z12.39 SCREENING FOR BREAST CANCER: ICD-10-CM

## 2018-05-03 DIAGNOSIS — R22.1 MASS IN NECK: Primary | ICD-10-CM

## 2018-05-03 DIAGNOSIS — R59.0 ENLARGED LYMPH NODE IN NECK: ICD-10-CM

## 2018-05-03 PROCEDURE — 77067 SCR MAMMO BI INCL CAD: CPT

## 2018-05-03 PROCEDURE — 77063 BREAST TOMOSYNTHESIS BI: CPT

## 2018-05-03 PROCEDURE — 70491 CT SOFT TISSUE NECK W/DYE: CPT

## 2018-05-03 RX ADMIN — IOHEXOL 85 ML: 350 INJECTION, SOLUTION INTRAVENOUS at 07:41

## 2018-05-08 ENCOUNTER — TELEPHONE (OUTPATIENT)
Dept: FAMILY MEDICINE CLINIC | Facility: HOSPITAL | Age: 48
End: 2018-05-08

## 2018-05-08 NOTE — TELEPHONE ENCOUNTER
No, actually what I told her was that I would send a message to his office to be sure they were aware of her referral   I can not control their schedule and have no power other than doing that  When did they schedule the appointment for her?

## 2018-05-08 NOTE — TELEPHONE ENCOUNTER
I reviewed her films and will schedule ASAP for Malgorzata  Thanks  For future reference, my cell # is  should you need it

## 2018-05-09 NOTE — TELEPHONE ENCOUNTER
Thank you very much  Janine Stoner, please contact patient and inform that Dr Aundria Osgood will be in touch

## 2018-05-09 NOTE — TELEPHONE ENCOUNTER
CONTACTED PATIENT SHE IS AWARE, AND DR Kirsty Santos OFFICE ALREADY CALLED HER ABOUT APPT WITH THEM

## 2018-05-21 DIAGNOSIS — Z00.00 HEALTH CARE MAINTENANCE: Primary | ICD-10-CM

## 2018-05-21 RX ORDER — DULOXETIN HYDROCHLORIDE 60 MG/1
CAPSULE, DELAYED RELEASE ORAL
Qty: 90 CAPSULE | Refills: 1 | Status: SHIPPED | OUTPATIENT
Start: 2018-05-21 | End: 2018-11-15 | Stop reason: SDUPTHER

## 2018-05-21 RX ORDER — METOPROLOL SUCCINATE 50 MG/1
TABLET, EXTENDED RELEASE ORAL
Qty: 90 TABLET | Refills: 1 | Status: SHIPPED | OUTPATIENT
Start: 2018-05-21 | End: 2018-11-15 | Stop reason: SDUPTHER

## 2018-06-06 PROBLEM — J38.7 LARYNGEAL MASS: Status: ACTIVE | Noted: 2018-06-06

## 2018-06-06 NOTE — ASSESSMENT & PLAN NOTE
Is to see ENT for eval and probable bx of mass seen on US of neck  Was seen and Rx for PPI given to try for one month, then f/u with possible bx done

## 2018-06-06 NOTE — PROGRESS NOTES
Assessment/Plan:    Laryngeal mass  Is to see ENT for eval and probable bx of mass seen on US of neck    Hypertension  Stable, no changes    Patient Active Problem List   Diagnosis    Fibromyalgia    Hypertension    Environmental allergies    Neck pain    Laryngeal mass     No orders of the defined types were placed in this encounter  Diagnoses and all orders for this visit:    Laryngeal mass    Essential hypertension    Neck pain  -     gabapentin (NEURONTIN) 100 mg capsule; Take 1 capsule (100 mg total) by mouth daily at bedtime for 30 days Take 2 capsules at bedtime, then taper to 1 capsule at bedtime as tolerated    Other orders  -     Cyanocobalamin 1000 MCG/ML KIT; cyanocobalamin (vit B-12) 1,000 mcg/mL injection solution  -     ergocalciferol (VITAMIN D2) 50,000 units; ergocalciferol (vitamin D2) 50,000 unit capsule    -------1 cap weekly  -     omeprazole (PriLOSEC) 40 MG capsule; 1 in the am  By malcolm wilson md   -     ranitidine (ZANTAC) 300 MG tablet; 1 at bedtime by dr Anushka Amin           Subjective:      Patient ID: Sreekanth Kim is a 50 y o  female  Here for follow up of labs and abnormal study,  Chronic conditions        The following portions of the patient's history were reviewed and updated as appropriate: allergies, current medications, past family history, past medical history, past social history, past surgical history and problem list     Review of Systems   Constitutional: Negative for fatigue and fever  HENT: Negative for hearing loss  Eyes: Negative for visual disturbance  Respiratory: Negative for cough, chest tightness, shortness of breath and wheezing  Cardiovascular: Negative for chest pain, palpitations and leg swelling  Gastrointestinal: Negative for abdominal pain, diarrhea and nausea  Genitourinary: Negative for dysuria and hematuria  Musculoskeletal: Negative for arthralgias  Neurological: Negative for dizziness, numbness and headaches  Psychiatric/Behavioral: Negative for confusion and dysphoric mood  All other systems reviewed and are negative  Objective:      Current Outpatient Prescriptions:     celecoxib (CeleBREX) 200 mg capsule, , Disp: , Rfl:     DULoxetine (CYMBALTA) 30 mg delayed release capsule, Take 1 capsule (30 mg total) by mouth daily for 30 days With 60 mg capsule for total dose of 90 mg, Disp: 30 capsule, Rfl: 5    DULoxetine (CYMBALTA) 60 mg delayed release capsule, TAKE 1 CAPSULE EVERY DAY, Disp: 90 capsule, Rfl: 1    ERGOCALCIFEROL IM, , Disp: , Rfl:     gabapentin (NEURONTIN) 100 mg capsule, Take 1 capsule (100 mg total) by mouth daily at bedtime Take with 300 mg as instructed , Disp: 30 capsule, Rfl: 4    gabapentin (NEURONTIN) 300 mg capsule, Take 1 capsule (300 mg total) by mouth daily at bedtime Take with 100 mg tablet as instructed, Disp: 60 capsule, Rfl: 0    gabapentin (NEURONTIN) 600 MG tablet, TAKE 1 TABLET BY MOUTH EVERY DAY, Disp: 30 tablet, Rfl: 3    lisinopril (ZESTRIL) 10 mg tablet, Take 1 tablet by mouth daily, Disp: , Rfl:     metoprolol succinate (TOPROL-XL) 50 mg 24 hr tablet, TAKE 1 TABLET BY MOUTH EVERY DAY, Disp: 90 tablet, Rfl: 1    montelukast (SINGULAIR) 10 mg tablet, TAKE 1 TABLET BY MOUTH EVERY DAY, Disp: 30 tablet, Rfl: 3     Physical Exam   Constitutional: She is oriented to person, place, and time  She appears well-developed and well-nourished  Eyes: Pupils are equal, round, and reactive to light  Neck: Normal range of motion  Neck supple  No thyromegaly present  Cardiovascular: Normal rate, regular rhythm, normal heart sounds and intact distal pulses  No murmur heard  Pulmonary/Chest: Effort normal and breath sounds normal  She has no wheezes  She has no rales  Abdominal: Soft  Bowel sounds are normal  There is no tenderness  Musculoskeletal: Normal range of motion  She exhibits no edema, tenderness or deformity     Lymphadenopathy:     She has no cervical adenopathy  Neurological: She is alert and oriented to person, place, and time  She has normal reflexes  Skin: Skin is warm and dry  Psychiatric: She has a normal mood and affect  Vitals reviewed

## 2018-06-07 ENCOUNTER — OFFICE VISIT (OUTPATIENT)
Dept: FAMILY MEDICINE CLINIC | Facility: HOSPITAL | Age: 48
End: 2018-06-07
Payer: COMMERCIAL

## 2018-06-07 VITALS
SYSTOLIC BLOOD PRESSURE: 120 MMHG | RESPIRATION RATE: 16 BRPM | HEART RATE: 80 BPM | DIASTOLIC BLOOD PRESSURE: 78 MMHG | BODY MASS INDEX: 38.45 KG/M2 | HEIGHT: 63 IN | WEIGHT: 217 LBS

## 2018-06-07 DIAGNOSIS — J38.7 LARYNGEAL MASS: Primary | ICD-10-CM

## 2018-06-07 DIAGNOSIS — M54.2 NECK PAIN: ICD-10-CM

## 2018-06-07 DIAGNOSIS — I10 ESSENTIAL HYPERTENSION: ICD-10-CM

## 2018-06-07 PROCEDURE — 3078F DIAST BP <80 MM HG: CPT | Performed by: INTERNAL MEDICINE

## 2018-06-07 PROCEDURE — 99214 OFFICE O/P EST MOD 30 MIN: CPT | Performed by: INTERNAL MEDICINE

## 2018-06-07 PROCEDURE — 3008F BODY MASS INDEX DOCD: CPT | Performed by: INTERNAL MEDICINE

## 2018-06-07 PROCEDURE — 3074F SYST BP LT 130 MM HG: CPT | Performed by: INTERNAL MEDICINE

## 2018-06-07 RX ORDER — OMEPRAZOLE 40 MG/1
40 CAPSULE, DELAYED RELEASE ORAL DAILY
COMMUNITY
Start: 2018-06-05 | End: 2020-02-11 | Stop reason: ALTCHOICE

## 2018-06-07 RX ORDER — GABAPENTIN 100 MG/1
100 CAPSULE ORAL
Qty: 90 CAPSULE | Refills: 0 | Status: SHIPPED | OUTPATIENT
Start: 2018-06-07 | End: 2019-08-01 | Stop reason: ALTCHOICE

## 2018-06-07 RX ORDER — RANITIDINE 300 MG/1
300 TABLET ORAL DAILY
COMMUNITY
Start: 2018-06-05 | End: 2019-08-01 | Stop reason: ALTCHOICE

## 2018-06-07 RX ORDER — ERGOCALCIFEROL 1.25 MG/1
CAPSULE ORAL
COMMUNITY
End: 2019-08-01 | Stop reason: ALTCHOICE

## 2018-06-07 NOTE — PROGRESS NOTES
Assessment/Plan:    Laryngeal mass  Is to see ENT for eval and probable bx of mass seen on US of neck  Was seen and Rx for PPI given to try for one month, then f/u with possible bx done  Hypertension  Stable, no changes    Neck pain  wweaning off gabapentin and doing OK    Patient Active Problem List   Diagnosis    Fibromyalgia    Hypertension    Environmental allergies    Neck pain    Laryngeal mass     No orders of the defined types were placed in this encounter  Diagnoses and all orders for this visit:    Laryngeal mass    Essential hypertension    Neck pain  -     gabapentin (NEURONTIN) 100 mg capsule; Take 1 capsule (100 mg total) by mouth daily at bedtime for 30 days Take 2 capsules at bedtime, then taper to 1 capsule at bedtime as tolerated    Other orders  -     Cyanocobalamin 1000 MCG/ML KIT; cyanocobalamin (vit B-12) 1,000 mcg/mL injection solution  -     ergocalciferol (VITAMIN D2) 50,000 units; ergocalciferol (vitamin D2) 50,000 unit capsule    -------1 cap weekly  -     omeprazole (PriLOSEC) 40 MG capsule; 1 in the am  By malcolm wilson md   -     ranitidine (ZANTAC) 300 MG tablet; 1 at bedtime by dr Glenna Mcnally           Subjective:      Patient ID: Amarilys Ramirez is a 50 y o  female  Here for f/u  Weaning off gabapentin, no worsening of neck pain but some sleep trouble  Laryngeal mass seen on US is being worked up by ENT and has f/u visit scheduled    BP is good  The following portions of the patient's history were reviewed and updated as appropriate: allergies, current medications, past family history, past medical history, past social history, past surgical history and problem list     Review of Systems   Constitutional: Negative for fatigue and fever  HENT: Negative for hearing loss  Eyes: Negative for visual disturbance  Respiratory: Negative for cough, chest tightness, shortness of breath and wheezing      Cardiovascular: Negative for chest pain, palpitations and leg swelling  Gastrointestinal: Negative for abdominal pain, diarrhea and nausea  Genitourinary: Negative for dysuria and hematuria  Musculoskeletal: Negative for arthralgias  Neurological: Negative for dizziness, numbness and headaches  Psychiatric/Behavioral: Negative for confusion and dysphoric mood  All other systems reviewed and are negative  Objective:      Current Outpatient Prescriptions:     celecoxib (CeleBREX) 200 mg capsule, 1 cap bid , Disp: , Rfl:     Cyanocobalamin 1000 MCG/ML KIT, cyanocobalamin (vit B-12) 1,000 mcg/mL injection solution, Disp: , Rfl:     DULoxetine (CYMBALTA) 30 mg delayed release capsule, Take 1 capsule (30 mg total) by mouth daily for 30 days With 60 mg capsule for total dose of 90 mg, Disp: 30 capsule, Rfl: 5    DULoxetine (CYMBALTA) 60 mg delayed release capsule, TAKE 1 CAPSULE EVERY DAY, Disp: 90 capsule, Rfl: 1    ergocalciferol (VITAMIN D2) 50,000 units, ergocalciferol (vitamin D2) 50,000 unit capsule    -------1 cap weekly, Disp: , Rfl:     gabapentin (NEURONTIN) 100 mg capsule, Take 1 capsule (100 mg total) by mouth daily at bedtime for 30 days Take 2 capsules at bedtime, then taper to 1 capsule at bedtime as tolerated, Disp: 90 capsule, Rfl: 0    gabapentin (NEURONTIN) 300 mg capsule, Take 1 capsule (300 mg total) by mouth daily at bedtime Take with 100 mg tablet as instructed (Patient taking differently: Takes 1 tab at bedtime     (Pt is weaning off of this med) ), Disp: 60 capsule, Rfl: 0    lisinopril (ZESTRIL) 10 mg tablet, Take 1 tablet by mouth daily, Disp: , Rfl:     metoprolol succinate (TOPROL-XL) 50 mg 24 hr tablet, TAKE 1 TABLET BY MOUTH EVERY DAY, Disp: 90 tablet, Rfl: 1    montelukast (SINGULAIR) 10 mg tablet, TAKE 1 TABLET BY MOUTH EVERY DAY, Disp: 30 tablet, Rfl: 3    omeprazole (PriLOSEC) 40 MG capsule, 1 in the am  By malcolm wilson md , Disp: , Rfl:     ranitidine (ZANTAC) 300 MG tablet, 1 at bedtime by dr Lashawn Villeda , Disp: , Rfl:      Physical Exam   Constitutional: She is oriented to person, place, and time  She appears well-developed and well-nourished  Eyes: Pupils are equal, round, and reactive to light  Neck: Normal range of motion  Neck supple  No thyromegaly present  Cardiovascular: Normal rate, regular rhythm, normal heart sounds and intact distal pulses  No murmur heard  Pulmonary/Chest: Effort normal and breath sounds normal  She has no wheezes  She has no rales  Abdominal: Soft  Bowel sounds are normal  There is no tenderness  Musculoskeletal: Normal range of motion  She exhibits no edema, tenderness or deformity  Lymphadenopathy:     She has no cervical adenopathy  Neurological: She is alert and oriented to person, place, and time  She has normal reflexes  Skin: Skin is warm and dry  Psychiatric: She has a normal mood and affect  Nursing note and vitals reviewed

## 2018-06-25 ENCOUNTER — TRANSCRIBE ORDERS (OUTPATIENT)
Dept: ADMINISTRATIVE | Facility: HOSPITAL | Age: 48
End: 2018-06-25

## 2018-06-25 ENCOUNTER — APPOINTMENT (OUTPATIENT)
Dept: LAB | Facility: HOSPITAL | Age: 48
End: 2018-06-25
Attending: OTOLARYNGOLOGY
Payer: COMMERCIAL

## 2018-06-25 ENCOUNTER — HOSPITAL ENCOUNTER (OUTPATIENT)
Dept: NON INVASIVE DIAGNOSTICS | Facility: HOSPITAL | Age: 48
Discharge: HOME | End: 2018-06-25
Attending: OTOLARYNGOLOGY
Payer: COMMERCIAL

## 2018-06-25 DIAGNOSIS — J35.1 LINGUAL TONSIL HYPERTROPHY: ICD-10-CM

## 2018-06-25 DIAGNOSIS — J35.1 LINGUAL TONSIL HYPERTROPHY: Primary | ICD-10-CM

## 2018-06-25 LAB
ANION GAP SERPL CALCULATED.3IONS-SCNC: 5 MMOL/L (ref 4–13)
BASOPHILS # BLD AUTO: 0.02 THOUSANDS/ΜL (ref 0–0.1)
BASOPHILS NFR BLD AUTO: 0 % (ref 0–1)
BUN SERPL-MCNC: 9 MG/DL (ref 5–25)
CALCIUM SERPL-MCNC: 8.7 MG/DL (ref 8.3–10.1)
CHLORIDE SERPL-SCNC: 103 MMOL/L (ref 100–108)
CO2 SERPL-SCNC: 29 MMOL/L (ref 21–32)
CREAT SERPL-MCNC: 0.87 MG/DL (ref 0.6–1.3)
EOSINOPHIL # BLD AUTO: 0.18 THOUSAND/ΜL (ref 0–0.61)
EOSINOPHIL NFR BLD AUTO: 3 % (ref 0–6)
ERYTHROCYTE [DISTWIDTH] IN BLOOD BY AUTOMATED COUNT: 13.1 % (ref 11.6–15.1)
GFR SERPL CREATININE-BSD FRML MDRD: 79 ML/MIN/1.73SQ M
GLUCOSE SERPL-MCNC: 128 MG/DL (ref 65–140)
HCT VFR BLD AUTO: 42.9 % (ref 34.8–46.1)
HGB BLD-MCNC: 14.2 G/DL (ref 11.5–15.4)
IMM GRANULOCYTES # BLD AUTO: 0.05 THOUSAND/UL (ref 0–0.2)
IMM GRANULOCYTES NFR BLD AUTO: 1 % (ref 0–2)
LYMPHOCYTES # BLD AUTO: 1.41 THOUSANDS/ΜL (ref 0.6–4.47)
LYMPHOCYTES NFR BLD AUTO: 25 % (ref 14–44)
MCH RBC QN AUTO: 30.9 PG (ref 26.8–34.3)
MCHC RBC AUTO-ENTMCNC: 33.1 G/DL (ref 31.4–37.4)
MCV RBC AUTO: 94 FL (ref 82–98)
MONOCYTES # BLD AUTO: 0.41 THOUSAND/ΜL (ref 0.17–1.22)
MONOCYTES NFR BLD AUTO: 7 % (ref 4–12)
NEUTROPHILS # BLD AUTO: 3.61 THOUSANDS/ΜL (ref 1.85–7.62)
NEUTS SEG NFR BLD AUTO: 64 % (ref 43–75)
PLATELET # BLD AUTO: 364 THOUSANDS/UL (ref 149–390)
PMV BLD AUTO: 10.8 FL (ref 8.9–12.7)
POTASSIUM SERPL-SCNC: 4.9 MMOL/L (ref 3.5–5.3)
RBC # BLD AUTO: 4.59 MILLION/UL (ref 3.81–5.12)
SODIUM SERPL-SCNC: 137 MMOL/L (ref 136–145)
WBC # BLD AUTO: 5.68 THOUSAND/UL (ref 4.31–10.16)

## 2018-06-25 PROCEDURE — 85025 COMPLETE CBC W/AUTO DIFF WBC: CPT

## 2018-06-25 PROCEDURE — 93005 ELECTROCARDIOGRAM TRACING: CPT

## 2018-06-25 PROCEDURE — 36415 COLL VENOUS BLD VENIPUNCTURE: CPT

## 2018-06-25 PROCEDURE — 80048 BASIC METABOLIC PNL TOTAL CA: CPT

## 2018-06-26 LAB
ATRIAL RATE: 74 BPM
P AXIS: 24 DEGREES
PR INTERVAL: 160 MS
QRS AXIS: 33 DEGREES
QRSD INTERVAL: 74 MS
QT INTERVAL: 398 MS
QTC INTERVAL: 441 MS
T WAVE AXIS: 33 DEGREES
VENTRICULAR RATE: 74 BPM

## 2018-06-26 PROCEDURE — 93010 ELECTROCARDIOGRAM REPORT: CPT | Performed by: INTERNAL MEDICINE

## 2018-07-05 NOTE — PRE-PROCEDURE INSTRUCTIONS
Pre-Surgery Instructions:   Medication Instructions    celecoxib (CeleBREX) 200 mg capsule Patient was instructed by Physician and understands   Cyanocobalamin 1000 MCG/ML KIT Instructed patient per Anesthesia Guidelines   DULoxetine (CYMBALTA) 30 mg delayed release capsule Instructed patient per Anesthesia Guidelines   DULoxetine (CYMBALTA) 60 mg delayed release capsule Instructed patient per Anesthesia Guidelines   ergocalciferol (VITAMIN D2) 50,000 units Instructed patient per Anesthesia Guidelines   gabapentin (NEURONTIN) 100 mg capsule Instructed patient per Anesthesia Guidelines   lisinopril (ZESTRIL) 10 mg tablet Instructed patient per Anesthesia Guidelines   metoprolol succinate (TOPROL-XL) 50 mg 24 hr tablet Instructed patient per Anesthesia Guidelines   montelukast (SINGULAIR) 10 mg tablet Instructed patient per Anesthesia Guidelines   omeprazole (PriLOSEC) 40 MG capsule Instructed patient per Anesthesia Guidelines   ranitidine (ZANTAC) 300 MG tablet Instructed patient per Anesthesia Guidelines  Pre-procedure instructions given without any questions or concerns at this time, pt will shower the evening prior and the morning of surgery with dial antibacterial soap

## 2018-07-11 ENCOUNTER — ANESTHESIA EVENT (OUTPATIENT)
Dept: PERIOP | Facility: HOSPITAL | Age: 48
End: 2018-07-11
Payer: COMMERCIAL

## 2018-07-12 ENCOUNTER — ANESTHESIA (OUTPATIENT)
Dept: PERIOP | Facility: HOSPITAL | Age: 48
End: 2018-07-12
Payer: COMMERCIAL

## 2018-07-12 ENCOUNTER — HOSPITAL ENCOUNTER (OUTPATIENT)
Facility: HOSPITAL | Age: 48
Setting detail: OUTPATIENT SURGERY
Discharge: HOME | End: 2018-07-12
Attending: OTOLARYNGOLOGY | Admitting: OTOLARYNGOLOGY
Payer: COMMERCIAL

## 2018-07-12 VITALS
SYSTOLIC BLOOD PRESSURE: 150 MMHG | DIASTOLIC BLOOD PRESSURE: 83 MMHG | HEART RATE: 88 BPM | RESPIRATION RATE: 32 BRPM | TEMPERATURE: 98.1 F | BODY MASS INDEX: 37.92 KG/M2 | HEIGHT: 63 IN | WEIGHT: 214 LBS | OXYGEN SATURATION: 94 %

## 2018-07-12 DIAGNOSIS — M54.2 NECK PAIN: Primary | ICD-10-CM

## 2018-07-12 DIAGNOSIS — J35.1 HYPERTROPHY OF TONSILS: ICD-10-CM

## 2018-07-12 PROCEDURE — 88189 FLOWCYTOMETRY/READ 16 & >: CPT | Performed by: PATHOLOGY

## 2018-07-12 PROCEDURE — 88309 TISSUE EXAM BY PATHOLOGIST: CPT | Performed by: PATHOLOGY

## 2018-07-12 PROCEDURE — 88185 FLOWCYTOMETRY/TC ADD-ON: CPT | Performed by: ANESTHESIOLOGY

## 2018-07-12 PROCEDURE — 88184 FLOWCYTOMETRY/ TC 1 MARKER: CPT | Performed by: OTOLARYNGOLOGY

## 2018-07-12 RX ORDER — FENTANYL CITRATE 50 UG/ML
INJECTION, SOLUTION INTRAMUSCULAR; INTRAVENOUS AS NEEDED
Status: DISCONTINUED | OUTPATIENT
Start: 2018-07-12 | End: 2018-07-12 | Stop reason: SURG

## 2018-07-12 RX ORDER — ONDANSETRON 2 MG/ML
INJECTION INTRAMUSCULAR; INTRAVENOUS AS NEEDED
Status: DISCONTINUED | OUTPATIENT
Start: 2018-07-12 | End: 2018-07-12 | Stop reason: SURG

## 2018-07-12 RX ORDER — HYDROCODONE BITARTRATE AND ACETAMINOPHEN 5; 325 MG/1; MG/1
2 TABLET ORAL EVERY 6 HOURS PRN
Qty: 15 TABLET | Refills: 0 | Status: SHIPPED | OUTPATIENT
Start: 2018-07-12 | End: 2018-07-22

## 2018-07-12 RX ORDER — METOCLOPRAMIDE HYDROCHLORIDE 5 MG/ML
10 INJECTION INTRAMUSCULAR; INTRAVENOUS ONCE AS NEEDED
Status: DISCONTINUED | OUTPATIENT
Start: 2018-07-12 | End: 2018-07-12 | Stop reason: HOSPADM

## 2018-07-12 RX ORDER — MIDAZOLAM HYDROCHLORIDE 1 MG/ML
INJECTION INTRAMUSCULAR; INTRAVENOUS AS NEEDED
Status: DISCONTINUED | OUTPATIENT
Start: 2018-07-12 | End: 2018-07-12 | Stop reason: SURG

## 2018-07-12 RX ORDER — PROPOFOL 10 MG/ML
INJECTION, EMULSION INTRAVENOUS AS NEEDED
Status: DISCONTINUED | OUTPATIENT
Start: 2018-07-12 | End: 2018-07-12 | Stop reason: SURG

## 2018-07-12 RX ORDER — FENTANYL CITRATE/PF 50 MCG/ML
25 SYRINGE (ML) INJECTION
Status: DISCONTINUED | OUTPATIENT
Start: 2018-07-12 | End: 2018-07-12 | Stop reason: HOSPADM

## 2018-07-12 RX ORDER — ROCURONIUM BROMIDE 10 MG/ML
INJECTION, SOLUTION INTRAVENOUS AS NEEDED
Status: DISCONTINUED | OUTPATIENT
Start: 2018-07-12 | End: 2018-07-12 | Stop reason: SURG

## 2018-07-12 RX ORDER — GLYCOPYRROLATE 0.2 MG/ML
INJECTION INTRAMUSCULAR; INTRAVENOUS AS NEEDED
Status: DISCONTINUED | OUTPATIENT
Start: 2018-07-12 | End: 2018-07-12 | Stop reason: SURG

## 2018-07-12 RX ORDER — IBUPROFEN 600 MG/1
600 TABLET ORAL EVERY 6 HOURS PRN
Status: DISCONTINUED | OUTPATIENT
Start: 2018-07-12 | End: 2018-07-12 | Stop reason: HOSPADM

## 2018-07-12 RX ORDER — SODIUM CHLORIDE, SODIUM LACTATE, POTASSIUM CHLORIDE, CALCIUM CHLORIDE 600; 310; 30; 20 MG/100ML; MG/100ML; MG/100ML; MG/100ML
75 INJECTION, SOLUTION INTRAVENOUS CONTINUOUS
Status: DISCONTINUED | OUTPATIENT
Start: 2018-07-12 | End: 2018-07-12 | Stop reason: HOSPADM

## 2018-07-12 RX ORDER — HYDROCODONE BITARTRATE AND ACETAMINOPHEN 5; 325 MG/1; MG/1
2 TABLET ORAL EVERY 6 HOURS PRN
Status: DISCONTINUED | OUTPATIENT
Start: 2018-07-12 | End: 2018-07-12 | Stop reason: HOSPADM

## 2018-07-12 RX ADMIN — FENTANYL CITRATE 100 MCG: 50 INJECTION, SOLUTION INTRAMUSCULAR; INTRAVENOUS at 15:30

## 2018-07-12 RX ADMIN — ROCURONIUM BROMIDE 40 MG: 10 SOLUTION INTRAVENOUS at 15:30

## 2018-07-12 RX ADMIN — ONDANSETRON 4 MG: 2 INJECTION INTRAMUSCULAR; INTRAVENOUS at 16:00

## 2018-07-12 RX ADMIN — FENTANYL CITRATE 25 MCG: 50 INJECTION, SOLUTION INTRAMUSCULAR; INTRAVENOUS at 16:44

## 2018-07-12 RX ADMIN — CEFAZOLIN SODIUM 2000 MG: 1 SOLUTION INTRAVENOUS at 15:43

## 2018-07-12 RX ADMIN — NEOSTIGMINE METHYLSULFATE 4 MG: 1 INJECTION, SOLUTION INTRAMUSCULAR; INTRAVENOUS; SUBCUTANEOUS at 16:15

## 2018-07-12 RX ADMIN — PROPOFOL 200 MG: 10 INJECTION, EMULSION INTRAVENOUS at 15:30

## 2018-07-12 RX ADMIN — MIDAZOLAM HYDROCHLORIDE 2 MG: 1 INJECTION, SOLUTION INTRAMUSCULAR; INTRAVENOUS at 15:30

## 2018-07-12 RX ADMIN — FENTANYL CITRATE 25 MCG: 50 INJECTION, SOLUTION INTRAMUSCULAR; INTRAVENOUS at 16:29

## 2018-07-12 RX ADMIN — GLYCOPYRROLATE 0.8 MG: 0.2 INJECTION, SOLUTION INTRAMUSCULAR; INTRAVENOUS at 16:15

## 2018-07-12 RX ADMIN — DEXAMETHASONE SODIUM PHOSPHATE 10 MG: 10 INJECTION INTRAMUSCULAR; INTRAVENOUS at 15:55

## 2018-07-12 RX ADMIN — FENTANYL CITRATE 25 MCG: 50 INJECTION, SOLUTION INTRAMUSCULAR; INTRAVENOUS at 16:35

## 2018-07-12 RX ADMIN — SODIUM CHLORIDE, SODIUM LACTATE, POTASSIUM CHLORIDE, AND CALCIUM CHLORIDE 75 ML/HR: .6; .31; .03; .02 INJECTION, SOLUTION INTRAVENOUS at 13:00

## 2018-07-12 RX ADMIN — HYDROCODONE BITARTRATE AND ACETAMINOPHEN 2 TABLET: 5; 325 TABLET ORAL at 17:25

## 2018-07-12 RX ADMIN — ROCURONIUM BROMIDE 10 MG: 10 SOLUTION INTRAVENOUS at 16:01

## 2018-07-12 NOTE — ANESTHESIA PREPROCEDURE EVALUATION
Review of Systems/Medical History      History of anesthetic complications PONV    Cardiovascular  Hypertension controlled,    Pulmonary       GI/Hepatic    GERD well controlled,             Endo/Other     GYN       Hematology   Musculoskeletal    Comment: BMI 38 Arthritis     Neurology    Fibromyalgia   Psychology   Depression ,              Physical Exam    Airway    Mallampati score: II  TM Distance: >3 FB       Dental   Comment: Capped teeth,     Cardiovascular  Rhythm: regular, Rate: normal,     Pulmonary  Breath sounds clear to auscultation,     Other Findings        Anesthesia Plan  ASA Score- 2     Anesthesia Type- general with ASA Monitors  Additional Monitors:   Airway Plan: ETT  Plan Factors-    Induction- intravenous  Postoperative Plan-     Informed Consent- Anesthetic plan and risks discussed with patient  I personally reviewed this patient with the CRNA  Discussed and agreed on the Anesthesia Plan with the CRNA  Danay Cheng

## 2018-07-12 NOTE — OP NOTE
OPERATIVE REPORT  PATIENT NAME: Al Stover    :  1970  MRN: [id number]  Pt Location: [facility] OR ROOM 01    SURGERY DATE: 2018    Surgeon(s) and Role:     * Ke Smith MD - Primary    Preop Diagnosis:  Hypertrophy of lingual tonsil    Post-Op Diagnosis Codes:     * Hypertrophy of lingual tonsil    Procedure(s) (LRB):  LARYNGOSCOPY DIRECT (N/A)    Specimen(s):  ID Type Source Tests Collected by Time Destination   1 : base of tongue bx Tissue Tongue TISSUE EXAM Ke Smith MD 2018 1601    A : base of tongue bx Tissue Tongue LEUKEMIA/LYMPHOMA FLOW CYTOMETRY Ke Smith MD 2018 1600        Estimated Blood Loss:   Minimal    Drains:       Anesthesia Type:   General    Operative Indications:  Hypertrophy of tonsils [J35 1]  Lingual tonsil hypertrophy, base of tongue mass    Operative Findings:  LTH, normal larynx    Complications:   None    Procedure and Technique:  After positive identification, the Al Stover was transferred onto the operating room table in the supine position  Appropriate monitoring devices were put in place, anesthesia was induced, and the patient was intubated without difficulty  The operating room table was turned 90 degrees, and the patient was draped in the usual clean fashion  Before proceeding with surgery, the time-out procedure was completed  The oral cavity and oropharynx were then digitally palpated  No masses or lesions were felt  A dental guard was put in place and a laryngoscope was then carefully introduced and use to examine the patient's oral cavity, oropharynx, hypopharynx and larynx  With the exception of significant LTH, the subsites were unremarkable  Approximately 30 biopsies were taken with a large upbiting cup forceps from the bilateral BOT  Hemostasis was achieved  The [facility] was debulked  The laryngoscope and dental guard were then removed  All counts were correct at the end of the case, and no complications were encountered     I was present for the entire procedure    Patient Disposition:  PACU     SIGNATURE: Diane Serna MD  DATE: July 12, 2018  TIME: 4:16 PM

## 2018-07-22 LAB — SCAN RESULT: NORMAL

## 2018-08-03 DIAGNOSIS — I10 ESSENTIAL HYPERTENSION: Primary | ICD-10-CM

## 2018-08-06 RX ORDER — LISINOPRIL 10 MG/1
TABLET ORAL
Qty: 90 TABLET | Refills: 1 | Status: SHIPPED | OUTPATIENT
Start: 2018-08-06 | End: 2019-01-27 | Stop reason: SDUPTHER

## 2018-08-23 DIAGNOSIS — Z88.9 H/O MULTIPLE ALLERGIES: ICD-10-CM

## 2018-08-24 RX ORDER — MONTELUKAST SODIUM 10 MG/1
TABLET ORAL
Qty: 30 TABLET | Refills: 3 | Status: SHIPPED | OUTPATIENT
Start: 2018-08-24 | End: 2018-12-26 | Stop reason: SDUPTHER

## 2018-10-08 DIAGNOSIS — M79.7 FIBROMYALGIA: ICD-10-CM

## 2018-10-08 RX ORDER — DULOXETIN HYDROCHLORIDE 30 MG/1
CAPSULE, DELAYED RELEASE ORAL
Qty: 30 CAPSULE | Refills: 5 | Status: SHIPPED | OUTPATIENT
Start: 2018-10-08 | End: 2019-04-01 | Stop reason: SDUPTHER

## 2018-11-15 DIAGNOSIS — Z00.00 HEALTH CARE MAINTENANCE: ICD-10-CM

## 2018-11-16 RX ORDER — METOPROLOL SUCCINATE 50 MG/1
TABLET, EXTENDED RELEASE ORAL
Qty: 90 TABLET | Refills: 1 | Status: SHIPPED | OUTPATIENT
Start: 2018-11-16 | End: 2019-05-08 | Stop reason: SDUPTHER

## 2018-11-16 RX ORDER — DULOXETIN HYDROCHLORIDE 60 MG/1
CAPSULE, DELAYED RELEASE ORAL
Qty: 90 CAPSULE | Refills: 1 | Status: SHIPPED | OUTPATIENT
Start: 2018-11-16 | End: 2019-05-24 | Stop reason: SDUPTHER

## 2018-12-11 RX ORDER — OXYCODONE HYDROCHLORIDE AND ACETAMINOPHEN 5; 325 MG/1; MG/1
TABLET ORAL
COMMUNITY
End: 2019-08-01 | Stop reason: ALTCHOICE

## 2018-12-11 RX ORDER — TOBRAMYCIN 3 MG/ML
SOLUTION/ DROPS OPHTHALMIC
COMMUNITY
End: 2019-08-01 | Stop reason: ALTCHOICE

## 2018-12-11 RX ORDER — TRAMADOL HYDROCHLORIDE 50 MG/1
TABLET ORAL
COMMUNITY
End: 2019-08-01 | Stop reason: ALTCHOICE

## 2018-12-11 RX ORDER — TIZANIDINE 2 MG/1
TABLET ORAL
COMMUNITY
End: 2019-08-01 | Stop reason: ALTCHOICE

## 2018-12-11 RX ORDER — HYDROCODONE BITARTRATE AND ACETAMINOPHEN 5; 325 MG/1; MG/1
TABLET ORAL
COMMUNITY
End: 2019-08-01 | Stop reason: ALTCHOICE

## 2018-12-11 RX ORDER — INFLUENZA VIRUS VACCINE 15; 15; 15; 15 UG/.5ML; UG/.5ML; UG/.5ML; UG/.5ML
SUSPENSION INTRAMUSCULAR
Refills: 0 | COMMUNITY
Start: 2018-10-01 | End: 2019-08-01 | Stop reason: ALTCHOICE

## 2018-12-12 ENCOUNTER — OFFICE VISIT (OUTPATIENT)
Dept: FAMILY MEDICINE CLINIC | Facility: HOSPITAL | Age: 48
End: 2018-12-12
Payer: COMMERCIAL

## 2018-12-12 VITALS
DIASTOLIC BLOOD PRESSURE: 82 MMHG | HEIGHT: 63 IN | BODY MASS INDEX: 39.69 KG/M2 | SYSTOLIC BLOOD PRESSURE: 130 MMHG | WEIGHT: 224 LBS | HEART RATE: 84 BPM | OXYGEN SATURATION: 95 %

## 2018-12-12 DIAGNOSIS — N60.02 CYST (SOLITARY) OF BREAST, LEFT: ICD-10-CM

## 2018-12-12 DIAGNOSIS — I10 ESSENTIAL HYPERTENSION: Primary | ICD-10-CM

## 2018-12-12 DIAGNOSIS — Z00.00 HEALTH CARE MAINTENANCE: ICD-10-CM

## 2018-12-12 DIAGNOSIS — J38.7 LARYNGEAL MASS: ICD-10-CM

## 2018-12-12 DIAGNOSIS — Z91.09 ENVIRONMENTAL ALLERGIES: ICD-10-CM

## 2018-12-12 PROCEDURE — 3008F BODY MASS INDEX DOCD: CPT | Performed by: INTERNAL MEDICINE

## 2018-12-12 PROCEDURE — 3079F DIAST BP 80-89 MM HG: CPT | Performed by: INTERNAL MEDICINE

## 2018-12-12 PROCEDURE — 99214 OFFICE O/P EST MOD 30 MIN: CPT | Performed by: INTERNAL MEDICINE

## 2018-12-12 PROCEDURE — 1036F TOBACCO NON-USER: CPT | Performed by: INTERNAL MEDICINE

## 2018-12-12 PROCEDURE — 3075F SYST BP GE 130 - 139MM HG: CPT | Performed by: INTERNAL MEDICINE

## 2018-12-12 RX ORDER — DIPHENHYDRAMINE HCL 25 MG
1 TABLET ORAL
COMMUNITY
End: 2020-02-11 | Stop reason: ALTCHOICE

## 2018-12-12 RX ORDER — PHENOL 1.4 %
1 AEROSOL, SPRAY (ML) MUCOUS MEMBRANE
COMMUNITY
End: 2019-08-01 | Stop reason: ALTCHOICE

## 2018-12-12 NOTE — PATIENT INSTRUCTIONS
You were seen for a follow up of chronic medical problems today  Be sure to make any changes to your medication as discussed by your doctor  If blood tests or other testing was ordered, be sure to obtain this at the time requested by the doctor  Our office will call you with the results of your tests once they arrive in our office  Labs before next visit ordered today

## 2018-12-12 NOTE — PROGRESS NOTES
Subjective:   Chief Complaint   Patient presents with    Follow-up     6 month         Patient ID: Arnoldo Atkinson is a 50 y o  female  Follow up of chronic and review of ENT consults  Still with swollen glands and sore throat  ENT considering tonsilectomy  Follow up scheudled  The following portions of the patient's history were reviewed and updated as appropriate: allergies, current medications, past family history, past medical history, past social history, past surgical history and problem list     Review of Systems   Constitutional: Negative for fatigue and fever  HENT: Negative for hearing loss  Eyes: Negative for visual disturbance  Respiratory: Negative for cough, chest tightness, shortness of breath and wheezing  Cardiovascular: Negative for chest pain, palpitations and leg swelling  Gastrointestinal: Negative for abdominal pain, diarrhea and nausea  Genitourinary: Negative for dysuria and hematuria  Musculoskeletal: Negative for arthralgias  Neurological: Negative for dizziness, numbness and headaches  Psychiatric/Behavioral: Negative for confusion and dysphoric mood  All other systems reviewed and are negative          Current Outpatient Prescriptions on File Prior to Visit   Medication Sig Dispense Refill    celecoxib (CeleBREX) 200 mg capsule Take 200 mg by mouth every evening 1 cap bid       Cyanocobalamin 1000 MCG/ML KIT cyanocobalamin (vit B-12) 1,000 mcg/mL injection solution      DULoxetine (CYMBALTA) 30 mg delayed release capsule TAKE 1 CAPSULE BY MOUTH DAILY FOR 30 DAYS WITH 60 MG CAPSULE FOR TOTAL DOSE OF 90 MG 30 capsule 5    DULoxetine (CYMBALTA) 60 mg delayed release capsule TAKE 1 CAPSULE EVERY DAY 90 capsule 1    ergocalciferol (VITAMIN D2) 50,000 units ergocalciferol (vitamin D2) 50,000 unit capsule    -------1 cap weekly      lisinopril (ZESTRIL) 10 mg tablet TAKE 1 TABLET EVERY DAY 90 tablet 1    metoprolol succinate (TOPROL-XL) 50 mg 24 hr tablet TAKE 1 TABLET BY MOUTH EVERY DAY 90 tablet 1    montelukast (SINGULAIR) 10 mg tablet TAKE 1 TABLET BY MOUTH EVERY DAY 30 tablet 3    omeprazole (PriLOSEC) 40 MG capsule Take 40 mg by mouth daily 1 in the am  By malcolm wilson md       gabapentin (NEURONTIN) 100 mg capsule Take 1 capsule (100 mg total) by mouth daily at bedtime for 30 days Take 2 capsules at bedtime, then taper to 1 capsule at bedtime as tolerated (Patient taking differently: Take 200 mg by mouth daily at bedtime Take 2 capsules at bedtime, then taper to 1 capsule at bedtime as tolerated ) 90 capsule 0    ranitidine (ZANTAC) 300 MG tablet Take 300 mg by mouth daily 1 at bedtime by dr Elba wilson        No current facility-administered medications on file prior to visit  Objective:  Vitals:    12/12/18 0814 12/12/18 0903   BP: 120/90 130/82   Pulse: 84    SpO2: 95%    Weight: 102 kg (224 lb)    Height: 5' 3" (1 6 m)       Physical Exam   Constitutional: She is oriented to person, place, and time  She appears well-developed and well-nourished  Eyes: Pupils are equal, round, and reactive to light  Neck: Normal range of motion  Neck supple  No thyromegaly present  Cardiovascular: Normal rate, regular rhythm, normal heart sounds and intact distal pulses  No murmur heard  Pulmonary/Chest: Effort normal and breath sounds normal  She has no wheezes  She has no rales  Abdominal: Soft  Bowel sounds are normal  There is no tenderness  Musculoskeletal: Normal range of motion  She exhibits no edema, tenderness or deformity  Lymphadenopathy:     She has no cervical adenopathy  Neurological: She is alert and oriented to person, place, and time  She has normal reflexes  Skin: Skin is warm and dry  Psychiatric: She has a normal mood and affect  Nursing note and vitals reviewed  Assessment/Plan:    Hypertension  Is compliant with meds    Does not check at home    Laryngeal mass  Seeing ENT at [facility] and plan is possible tonsillectomy     Cyst (solitary) of breast, left  Recent mammo reviewed and is benign  At my exam, no palpable mass present  Diagnoses and all orders for this visit:    Essential hypertension  -     Comprehensive metabolic panel; Future  -     Lipid Panel with Direct LDL reflex; Future    Laryngeal mass    Environmental allergies    Cyst (solitary) of breast, left    Health care maintenance  -     CBC and differential; Future  -     TSH, 3rd generation; Future    Other orders  -     FLUARIX QUADRIVALENT 0 5 ML RANDY; inject 0 5 milliliter intramuscularly  -     HYDROcodone-acetaminophen (NORCO) 5-325 mg per tablet; hydrocodone 5 mg-acetaminophen 325 mg tablet  -     mupirocin (BACTROBAN) 2 % ointment; mupirocin 2 % topical ointment  -     oxyCODONE-acetaminophen (PERCOCET) 5-325 mg per tablet; 1-2 tabs poq 4-6 hours prn pain post surgery  -     tiZANidine (ZANAFLEX) 2 mg tablet; tizanidine 2 mg tablet  -     tobramycin (TOBREX) 0 3 % SOLN; tobramycin 0 3 % eye drops  -     traMADol (ULTRAM) 50 mg tablet; Take 1 tablet every 6-8 hours by oral route as needed  -     diphenhydrAMINE (BENADRYL) 25 mg tablet;  Take 1 tablet by mouth  -     Melatonin 10 MG TABS; Take 1 tablet by mouth

## 2018-12-26 DIAGNOSIS — Z88.9 H/O MULTIPLE ALLERGIES: ICD-10-CM

## 2018-12-26 RX ORDER — MONTELUKAST SODIUM 10 MG/1
TABLET ORAL
Qty: 30 TABLET | Refills: 3 | Status: SHIPPED | OUTPATIENT
Start: 2018-12-26 | End: 2019-06-24 | Stop reason: SDUPTHER

## 2019-01-27 DIAGNOSIS — I10 ESSENTIAL HYPERTENSION: ICD-10-CM

## 2019-01-28 RX ORDER — LISINOPRIL 10 MG/1
TABLET ORAL
Qty: 90 TABLET | Refills: 1 | Status: SHIPPED | OUTPATIENT
Start: 2019-01-28 | End: 2019-07-18 | Stop reason: SDUPTHER

## 2019-04-01 DIAGNOSIS — M79.7 FIBROMYALGIA: ICD-10-CM

## 2019-04-01 RX ORDER — DULOXETIN HYDROCHLORIDE 30 MG/1
CAPSULE, DELAYED RELEASE ORAL
Qty: 30 CAPSULE | Refills: 5 | Status: SHIPPED | OUTPATIENT
Start: 2019-04-01 | End: 2020-01-03 | Stop reason: SDUPTHER

## 2019-05-08 DIAGNOSIS — Z00.00 HEALTH CARE MAINTENANCE: ICD-10-CM

## 2019-05-09 RX ORDER — METOPROLOL SUCCINATE 50 MG/1
TABLET, EXTENDED RELEASE ORAL
Qty: 90 TABLET | Refills: 1 | Status: SHIPPED | OUTPATIENT
Start: 2019-05-09 | End: 2019-10-30 | Stop reason: SDUPTHER

## 2019-05-15 ENCOUNTER — TELEPHONE (OUTPATIENT)
Dept: NEUROLOGY | Facility: CLINIC | Age: 49
End: 2019-05-15

## 2019-05-24 DIAGNOSIS — Z00.00 HEALTH CARE MAINTENANCE: ICD-10-CM

## 2019-05-28 RX ORDER — DULOXETIN HYDROCHLORIDE 60 MG/1
CAPSULE, DELAYED RELEASE ORAL
Qty: 90 CAPSULE | Refills: 1 | Status: SHIPPED | OUTPATIENT
Start: 2019-05-28 | End: 2019-08-01 | Stop reason: ALTCHOICE

## 2019-06-24 DIAGNOSIS — Z88.9 H/O MULTIPLE ALLERGIES: ICD-10-CM

## 2019-06-24 RX ORDER — MONTELUKAST SODIUM 10 MG/1
TABLET ORAL
Qty: 30 TABLET | Refills: 3 | Status: SHIPPED | OUTPATIENT
Start: 2019-06-24 | End: 2019-10-11 | Stop reason: SDUPTHER

## 2019-07-18 DIAGNOSIS — I10 ESSENTIAL HYPERTENSION: ICD-10-CM

## 2019-07-18 RX ORDER — LISINOPRIL 10 MG/1
TABLET ORAL
Qty: 90 TABLET | Refills: 1 | Status: SHIPPED | OUTPATIENT
Start: 2019-07-18 | End: 2019-12-24 | Stop reason: SDUPTHER

## 2019-07-26 LAB
ALBUMIN SERPL-MCNC: 4.2 G/DL (ref 3.6–5.1)
ALBUMIN/GLOB SERPL: 1.4 (CALC) (ref 1–2.5)
ALP SERPL-CCNC: 152 U/L (ref 33–115)
ALT SERPL-CCNC: 19 U/L (ref 6–29)
AST SERPL-CCNC: 25 U/L (ref 10–35)
BASOPHILS # BLD AUTO: 0 CELLS/UL (ref 0–200)
BASOPHILS NFR BLD AUTO: 0 %
BILIRUB SERPL-MCNC: 0.5 MG/DL (ref 0.2–1.2)
BUN SERPL-MCNC: 10 MG/DL (ref 7–25)
BUN/CREAT SERPL: ABNORMAL (CALC) (ref 6–22)
CALCIUM SERPL-MCNC: 9.5 MG/DL (ref 8.6–10.2)
CHLORIDE SERPL-SCNC: 101 MMOL/L (ref 98–110)
CHOLEST SERPL-MCNC: 199 MG/DL
CHOLEST/HDLC SERPL: 3 (CALC)
CO2 SERPL-SCNC: 28 MMOL/L (ref 20–32)
CREAT SERPL-MCNC: 0.76 MG/DL (ref 0.5–1.1)
EOSINOPHIL # BLD AUTO: 0 CELLS/UL (ref 15–500)
EOSINOPHIL NFR BLD AUTO: 0 %
ERYTHROCYTE [DISTWIDTH] IN BLOOD BY AUTOMATED COUNT: 12.2 % (ref 11–15)
GLOBULIN SER CALC-MCNC: 3 G/DL (CALC) (ref 1.9–3.7)
GLUCOSE SERPL-MCNC: 95 MG/DL (ref 65–99)
HCT VFR BLD AUTO: 43.2 % (ref 35–45)
HDLC SERPL-MCNC: 66 MG/DL
HGB BLD-MCNC: 14.3 G/DL (ref 11.7–15.5)
LDLC SERPL CALC-MCNC: 95 MG/DL (CALC)
LYMPHOCYTES # BLD MANUAL: 2010 CELLS/UL (ref 850–3900)
LYMPHOCYTES NFR BLD AUTO: 30 %
MCH RBC QN AUTO: 31.5 PG (ref 27–33)
MCHC RBC AUTO-ENTMCNC: 33.1 G/DL (ref 32–36)
MCV RBC AUTO: 95.2 FL (ref 80–100)
MONOCYTES # BLD AUTO: 268 CELLS/UL (ref 200–950)
MONOCYTES NFR BLD AUTO: 4 %
MYELOCYTES # BLD: 268 CELLS/UL
MYELOCYTES NFR BLD MANUAL: 4 %
NEUTROPHILS # BLD AUTO: 3953 CELLS/UL (ref 1500–7800)
NEUTROPHILS NFR BLD AUTO: 59 %
NEUTS BAND # BLD: 201 CELLS/UL (ref 0–750)
NEUTS BAND NFR BLD MANUAL: 3 %
NONHDLC SERPL-MCNC: 133 MG/DL (CALC)
PLATELET # BLD AUTO: 347 THOUSAND/UL (ref 140–400)
PMV BLD REES-ECKER: 10.7 FL (ref 7.5–12.5)
POTASSIUM SERPL-SCNC: 5.3 MMOL/L (ref 3.5–5.3)
PROT SERPL-MCNC: 7.2 G/DL (ref 6.1–8.1)
RBC # BLD AUTO: 4.54 MILLION/UL (ref 3.8–5.1)
SL AMB EGFR AFRICAN AMERICAN: 107 ML/MIN/1.73M2
SL AMB EGFR NON AFRICAN AMERICAN: 92 ML/MIN/1.73M2
SODIUM SERPL-SCNC: 136 MMOL/L (ref 135–146)
TRIGL SERPL-MCNC: 263 MG/DL
TSH SERPL-ACNC: 1.51 MIU/L
WBC # BLD AUTO: 6.7 THOUSAND/UL (ref 3.8–10.8)

## 2019-08-01 ENCOUNTER — OFFICE VISIT (OUTPATIENT)
Dept: FAMILY MEDICINE CLINIC | Facility: HOSPITAL | Age: 49
End: 2019-08-01
Payer: COMMERCIAL

## 2019-08-01 VITALS
HEIGHT: 63 IN | HEART RATE: 87 BPM | DIASTOLIC BLOOD PRESSURE: 78 MMHG | SYSTOLIC BLOOD PRESSURE: 102 MMHG | BODY MASS INDEX: 41.11 KG/M2 | WEIGHT: 232 LBS | RESPIRATION RATE: 16 BRPM

## 2019-08-01 DIAGNOSIS — Z00.00 HEALTH CARE MAINTENANCE: ICD-10-CM

## 2019-08-01 DIAGNOSIS — I10 ESSENTIAL HYPERTENSION: ICD-10-CM

## 2019-08-01 DIAGNOSIS — R76.8 POSITIVE LYME DISEASE SEROLOGY: ICD-10-CM

## 2019-08-01 DIAGNOSIS — Z12.39 SCREENING BREAST EXAMINATION: Primary | ICD-10-CM

## 2019-08-01 DIAGNOSIS — M79.7 FIBROMYALGIA: ICD-10-CM

## 2019-08-01 DIAGNOSIS — E66.01 CLASS 3 SEVERE OBESITY DUE TO EXCESS CALORIES WITHOUT SERIOUS COMORBIDITY WITH BODY MASS INDEX (BMI) OF 40.0 TO 44.9 IN ADULT (HCC): ICD-10-CM

## 2019-08-01 DIAGNOSIS — K21.9 GASTROESOPHAGEAL REFLUX DISEASE WITHOUT ESOPHAGITIS: ICD-10-CM

## 2019-08-01 DIAGNOSIS — M54.32 SCIATICA, LEFT SIDE: ICD-10-CM

## 2019-08-01 PROBLEM — N60.02 CYST (SOLITARY) OF BREAST, LEFT: Status: RESOLVED | Noted: 2018-12-12 | Resolved: 2019-08-01

## 2019-08-01 PROBLEM — M54.2 NECK PAIN: Status: RESOLVED | Noted: 2018-04-19 | Resolved: 2019-08-01

## 2019-08-01 PROCEDURE — 99396 PREV VISIT EST AGE 40-64: CPT | Performed by: INTERNAL MEDICINE

## 2019-08-01 RX ORDER — DOXYCYCLINE HYCLATE 100 MG
100 TABLET ORAL 2 TIMES DAILY
Qty: 28 TABLET | Refills: 0 | Status: SHIPPED | OUTPATIENT
Start: 2019-08-01 | End: 2019-08-15

## 2019-08-01 RX ORDER — GABAPENTIN 600 MG/1
600 TABLET ORAL 2 TIMES DAILY
COMMUNITY
End: 2020-02-13 | Stop reason: HOSPADM

## 2019-08-01 NOTE — ASSESSMENT & PLAN NOTE
BMI Counseling: Body mass index is 41 1 kg/m²  Discussed the patient's BMI with her  The BMI is above average  BMI counseling and education was provided to the patient  Nutrition recommendations include decreasing overall calorie intake

## 2019-08-01 NOTE — ASSESSMENT & PLAN NOTE
Discussed with patient  Suggestion of positive test and will treat empirically with doxy for 2 weeks and reassess

## 2019-08-01 NOTE — PROGRESS NOTES
Subjective:   Chief Complaint   Patient presents with    Annual Exam        Patient ID: Nazanin Pascual is a 52 y o  female  Annual preventive visit today  Has been seeing ENT at [facility] and is planning surgery in fall  The following portions of the patient's history were reviewed and updated as appropriate: allergies, current medications, past family history, past medical history, past social history, past surgical history and problem list     Review of Systems   Constitutional: Negative for fatigue and fever  HENT: Negative for hearing loss  Eyes: Negative for visual disturbance  Respiratory: Positive for cough and choking  Negative for chest tightness, shortness of breath and wheezing  Cardiovascular: Negative for chest pain, palpitations and leg swelling  Gastrointestinal: Negative for abdominal pain, diarrhea and nausea  Genitourinary: Negative for dysuria and hematuria  Musculoskeletal: Positive for arthralgias, gait problem and myalgias  Neurological: Negative for dizziness, numbness and headaches  Psychiatric/Behavioral: Negative for confusion and dysphoric mood  All other systems reviewed and are negative          Current Outpatient Medications on File Prior to Visit   Medication Sig Dispense Refill    celecoxib (CeleBREX) 200 mg capsule Take 200 mg by mouth every evening 1 cap bid       diphenhydrAMINE (BENADRYL) 25 mg tablet Take 1 tablet by mouth      DULoxetine (CYMBALTA) 30 mg delayed release capsule TAKE ONE CAPSULE BY MOUTH EVERY DAY 30 capsule 5    gabapentin (NEURONTIN) 600 MG tablet 1 tab --three times daily      lisinopril (ZESTRIL) 10 mg tablet TAKE 1 TABLET EVERY DAY 90 tablet 1    metoprolol succinate (TOPROL-XL) 50 mg 24 hr tablet TAKE 1 TABLET BY MOUTH EVERY DAY 90 tablet 1    montelukast (SINGULAIR) 10 mg tablet TAKE 1 TABLET BY MOUTH EVERY DAY 30 tablet 3    omeprazole (PriLOSEC) 40 MG capsule Take 40 mg by mouth daily 1 in the am  By malcolm wilson md  [DISCONTINUED] Cyanocobalamin 1000 MCG/ML KIT cyanocobalamin (vit B-12) 1,000 mcg/mL injection solution      [DISCONTINUED] DULoxetine (CYMBALTA) 60 mg delayed release capsule TAKE 1 CAPSULE EVERY DAY (Patient not taking: Reported on 8/1/2019) 90 capsule 1    [DISCONTINUED] ergocalciferol (VITAMIN D2) 50,000 units ergocalciferol (vitamin D2) 50,000 unit capsule    -------1 cap weekly      [DISCONTINUED] FLUARIX QUADRIVALENT 0 5 ML RANDY inject 0 5 milliliter intramuscularly  0    [DISCONTINUED] gabapentin (NEURONTIN) 100 mg capsule Take 1 capsule (100 mg total) by mouth daily at bedtime for 30 days Take 2 capsules at bedtime, then taper to 1 capsule at bedtime as tolerated (Patient taking differently: Take 200 mg by mouth daily at bedtime Take 2 capsules at bedtime, then taper to 1 capsule at bedtime as tolerated ) 90 capsule 0    [DISCONTINUED] HYDROcodone-acetaminophen (NORCO) 5-325 mg per tablet hydrocodone 5 mg-acetaminophen 325 mg tablet      [DISCONTINUED] Melatonin 10 MG TABS Take 1 tablet by mouth      [DISCONTINUED] mupirocin (BACTROBAN) 2 % ointment mupirocin 2 % topical ointment      [DISCONTINUED] oxyCODONE-acetaminophen (PERCOCET) 5-325 mg per tablet 1-2 tabs poq 4-6 hours prn pain post surgery      [DISCONTINUED] ranitidine (ZANTAC) 300 MG tablet Take 300 mg by mouth daily 1 at bedtime by dr Theo Ahumada [DISCONTINUED] tiZANidine (ZANAFLEX) 2 mg tablet tizanidine 2 mg tablet      [DISCONTINUED] tobramycin (TOBREX) 0 3 % SOLN tobramycin 0 3 % eye drops      [DISCONTINUED] traMADol (ULTRAM) 50 mg tablet Take 1 tablet every 6-8 hours by oral route as needed  No current facility-administered medications on file prior to visit  Objective:  Vitals:    08/01/19 0908   BP: 102/78   Pulse: 87   Resp: 16   Weight: 105 kg (232 lb)   Height: 5' 3" (1 6 m)      Physical Exam   Constitutional: She is oriented to person, place, and time  She appears well-developed and well-nourished  Eyes: Pupils are equal, round, and reactive to light  Neck: Normal range of motion  Neck supple  No thyromegaly present  Cardiovascular: Normal rate, regular rhythm, normal heart sounds and intact distal pulses  No murmur heard  Pulmonary/Chest: Effort normal and breath sounds normal  She has no wheezes  She has no rales  Abdominal: Soft  Bowel sounds are normal  There is no tenderness  Musculoskeletal: Normal range of motion  She exhibits no edema, tenderness or deformity  Lymphadenopathy:     She has no cervical adenopathy  Neurological: She is alert and oriented to person, place, and time  She has normal reflexes  Skin: Skin is warm and dry  Psychiatric: She has a normal mood and affect  Nursing note and vitals reviewed  Assessment/Plan:    Class 3 severe obesity due to excess calories without serious comorbidity with body mass index (BMI) of 40 0 to 44 9 in adult ([facility])  BMI Counseling: Body mass index is 41 1 kg/m²  Discussed the patient's BMI with her  The BMI is above average  BMI counseling and education was provided to the patient  Nutrition recommendations include decreasing overall calorie intake           Diagnoses and all orders for this visit:    Screening breast examination  -     Mammo screening bilateral w 3d & cad; Future    Health care maintenance    Essential hypertension    Gastroesophageal reflux disease without esophagitis    Fibromyalgia    Class 3 severe obesity due to excess calories without serious comorbidity with body mass index (BMI) of 40 0 to 44 9 in adult [facility])    Other orders  -     gabapentin (NEURONTIN) 600 MG tablet; 1 tab --three times daily

## 2019-08-01 NOTE — PATIENT INSTRUCTIONS
Your visit today was an annual wellness visit  At this visit the doctor discusses with you routine preventive health measures, reviews your medication history, reviews your lab test and other screenings, or orders the appropriate tests needed  Immunizations are also reviewed and updated if needed  This is the time to be sure that you are doing everything you should to stay healthy! This visit is generally not considered the time to address any new issues or to make changes to the regimen for your chronic conditions unless this is felt to be important by the doctor  If tests or screenings have been ordered, please be sure to obtain them in a timely fashion and the office will contact you with results  All labs good       Refer to dr Justino Nickerson to evaluate back pain issues

## 2019-08-01 NOTE — ASSESSMENT & PLAN NOTE
MRI done recently shows some DDD in lumbar spine  Wants another opinion and I agree  Refer to Dr Justin Campos  Current regimen is gabapentin and celebrex with some relief noted

## 2019-09-12 ENCOUNTER — TELEPHONE (OUTPATIENT)
Dept: FAMILY MEDICINE CLINIC | Facility: HOSPITAL | Age: 49
End: 2019-09-12

## 2019-09-12 NOTE — TELEPHONE ENCOUNTER
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

## 2019-09-12 NOTE — TELEPHONE ENCOUNTER
Pt seen urg care[facility]  Dx pharnigitis  They recommended conservative tx    Can she still have her tonsil surg on 9/20 thx dk

## 2019-09-13 NOTE — TELEPHONE ENCOUNTER
The 1st message be low about Kawasaki's disease is not supported by the urgent care visit  This is a disease that young children get  Secondly I assume her tonsil surgery is a tonsillectomy  There is nothing from the Urgent Care visit to makes me think she can't have this surgery    However the since there are too many missing pieces, I really have no definite comment

## 2019-09-13 NOTE — TELEPHONE ENCOUNTER
Mess to pt  She saw surgeon yesterday and the surgeon didn't know enough about that disease and to ask her pcp  I told the pt again that there was no mention of kawasaki's in not and she said it wasn't on her printout either from the [facility] care, only pharyngitis  I told the pt she can make appt with gb next week if she wants too  She will call if that is what she decides to do  She thanked me for all my help on this  dk         Per pww who couldn't find my message on his end  Tell pt that he cant really say yes or no due to not enough info and he said to have pt ask the surgeon/ent  dk       I agree with you--missing pieces and did try calling pt yesterday before sending message to you, but she didn't answer  I talked to pt and she is having sublingual tonselectomy  The Kawasaki's disease was told to the pt by the [facility] care doc because she had 1 lesion on her uvula and that doc said that this disease usually presents this way in adults     dk

## 2019-10-11 DIAGNOSIS — Z88.9 H/O MULTIPLE ALLERGIES: ICD-10-CM

## 2019-10-11 RX ORDER — MONTELUKAST SODIUM 10 MG/1
TABLET ORAL
Qty: 90 TABLET | Refills: 1 | Status: SHIPPED | OUTPATIENT
Start: 2019-10-11 | End: 2019-12-24 | Stop reason: SDUPTHER

## 2019-10-29 ENCOUNTER — OFFICE VISIT (OUTPATIENT)
Dept: URGENT CARE | Facility: CLINIC | Age: 49
End: 2019-10-29
Payer: COMMERCIAL

## 2019-10-29 VITALS
DIASTOLIC BLOOD PRESSURE: 78 MMHG | HEART RATE: 79 BPM | OXYGEN SATURATION: 96 % | HEIGHT: 63 IN | RESPIRATION RATE: 20 BRPM | BODY MASS INDEX: 40.08 KG/M2 | TEMPERATURE: 99.3 F | WEIGHT: 226.2 LBS | SYSTOLIC BLOOD PRESSURE: 114 MMHG

## 2019-10-29 DIAGNOSIS — J01.01 ACUTE RECURRENT MAXILLARY SINUSITIS: Primary | ICD-10-CM

## 2019-10-29 DIAGNOSIS — Z00.00 HEALTH CARE MAINTENANCE: ICD-10-CM

## 2019-10-29 PROCEDURE — 99203 OFFICE O/P NEW LOW 30 MIN: CPT | Performed by: PREVENTIVE MEDICINE

## 2019-10-29 RX ORDER — AMOXICILLIN 500 MG/1
500 CAPSULE ORAL EVERY 8 HOURS SCHEDULED
Qty: 21 CAPSULE | Refills: 0 | Status: SHIPPED | OUTPATIENT
Start: 2019-10-29 | End: 2019-11-05

## 2019-10-29 NOTE — PATIENT INSTRUCTIONS
Increase fluids  Using Afrin nasal spray for 2-3 days will be helpful  Moist rising the nose by using steam coming from the same could also be helpful

## 2019-10-29 NOTE — PROGRESS NOTES
[address] [employer] [facility]        NAME: Ruby Bagley is a 52 y o  female  : 1970    MRN: [id number]  DATE: 2019  TIME: 7:39 PM    Assessment and Plan   Acute recurrent maxillary sinusitis [J01 01]  1  Acute recurrent maxillary sinusitis  amoxicillin (AMOXIL) 500 mg capsule         Patient Instructions       Follow up with PCP in 3-5 days  Proceed to  ER if symptoms worsen  Chief Complaint     Chief Complaint   Patient presents with    Facial Pain     Patient reports since Friday has had headache, sinus pain/pressure, tooth pain  Taking Muxinex with no relief  History of Present Illness       Three days of increasing postnasal drip and facial pain over the right maxillary and right frontal areas  The pain increases and decreases that she switches head position  She denies blowing the nose  Has mild cough  She has a history of sinus infections in the past   She says her teeth are in good repair with no issues in the mouth teeth or gums  Review of Systems   Review of Systems   HENT: Positive for sinus pressure            Current Medications       Current Outpatient Medications:     diphenhydrAMINE (BENADRYL) 25 mg tablet, Take 1 tablet by mouth, Disp: , Rfl:     DULoxetine (CYMBALTA) 30 mg delayed release capsule, TAKE ONE CAPSULE BY MOUTH EVERY DAY, Disp: 30 capsule, Rfl: 5    gabapentin (NEURONTIN) 600 MG tablet, 1 tab --three times daily, Disp: , Rfl:     lisinopril (ZESTRIL) 10 mg tablet, TAKE 1 TABLET EVERY DAY, Disp: 90 tablet, Rfl: 1    metoprolol succinate (TOPROL-XL) 50 mg 24 hr tablet, TAKE 1 TABLET BY MOUTH EVERY DAY, Disp: 90 tablet, Rfl: 1    montelukast (SINGULAIR) 10 mg tablet, TAKE 1 TABLET BY MOUTH EVERY DAY, Disp: 90 tablet, Rfl: 1    omeprazole (PriLOSEC) 40 MG capsule, Take 40 mg by mouth daily 1 in the am  By malcolm wilson md , Disp: , Rfl:     amoxicillin (AMOXIL) 500 mg capsule, Take 1 capsule (500 mg total) by mouth every 8 (eight) hours for 7 days, Disp: 21 capsule, Rfl: 0    Current Allergies     Allergies as of 10/29/2019 - Reviewed 10/29/2019   Allergen Reaction Noted    Sulfamethoxazole-trimethoprim Hives and Rash 02/16/2012    Ferumoxytol Other (See Comments) 03/30/2015    Sulfa antibiotics Rash 02/16/2012            The following portions of the patient's history were reviewed and updated as appropriate: allergies, current medications, past family history, past medical history, past social history, past surgical history and problem list      Past Medical History:   Diagnosis Date    Allergic     Depression     GERD (gastroesophageal reflux disease)     Hypertension     PONV (postoperative nausea and vomiting)     Tachycardia        Past Surgical History:   Procedure Laterality Date    ANTERIOR CRUCIATE LIGAMENT REPAIR      CHOLECYSTECTOMY      GASTRIC BYPASS      HYSTERECTOMY      INGUINAL HERNIA REPAIR      JOINT REPLACEMENT      KNEE ARTHROSCOPY Right     ORIF FINGER / THUMB FRACTURE Right     thumb surgery    OH LARYNGOSCOPY,DIRCT,OP SCOPE,BIOPSY N/A 7/12/2018    Procedure: LARYNGOSCOPY DIRECT;  Surgeon: Smitha Freeman MD;  Location:  MAIN OR;  Service: ENT    PYELOPLASTY      REPLACEMENT TOTAL KNEE Right     ROTATOR CUFF REPAIR         Family History   Adopted: Yes   Problem Relation Age of Onset    Diabetes Mother     Mental illness Mother         disorder    Hypertension Mother         benign    Substance Abuse Mother     Mental illness Father         disorder    Substance Abuse Father     Mental illness Family         disorder    Substance Abuse Family          Medications have been verified          Objective   /78   Pulse 79   Temp 99 3 °F (37 4 °C)   Resp 20   Ht 5' 3" (1 6 m)   Wt 103 kg (226 lb 3 2 oz)   LMP  (LMP Unknown)   SpO2 96%   BMI 40 07 kg/m²        Physical Exam     Physical Exam   HENT:   Right Ear: External ear normal    Left Ear: External ear normal    Mouth/Throat: Oropharynx is clear and moist    Tenderness and tapping over the right maxillary and right frontal sinus   Cardiovascular: Normal heart sounds  Pulmonary/Chest: Breath sounds normal    Lymphadenopathy:     She has no cervical adenopathy

## 2019-10-30 DIAGNOSIS — Z00.00 HEALTH CARE MAINTENANCE: ICD-10-CM

## 2019-10-30 RX ORDER — METOPROLOL SUCCINATE 50 MG/1
TABLET, EXTENDED RELEASE ORAL
Qty: 90 TABLET | Refills: 1 | Status: SHIPPED | OUTPATIENT
Start: 2019-10-30 | End: 2019-12-24 | Stop reason: SDUPTHER

## 2019-10-30 RX ORDER — DULOXETIN HYDROCHLORIDE 60 MG/1
CAPSULE, DELAYED RELEASE ORAL
Qty: 90 CAPSULE | Refills: 1 | OUTPATIENT
Start: 2019-10-30

## 2019-12-24 DIAGNOSIS — Z88.9 H/O MULTIPLE ALLERGIES: ICD-10-CM

## 2019-12-24 DIAGNOSIS — Z00.00 HEALTH CARE MAINTENANCE: ICD-10-CM

## 2019-12-24 DIAGNOSIS — I10 ESSENTIAL HYPERTENSION: ICD-10-CM

## 2019-12-24 RX ORDER — METOPROLOL SUCCINATE 50 MG/1
50 TABLET, EXTENDED RELEASE ORAL DAILY
Qty: 90 TABLET | Refills: 1 | Status: SHIPPED | OUTPATIENT
Start: 2019-12-24 | End: 2020-04-27 | Stop reason: SDUPTHER

## 2019-12-24 RX ORDER — LISINOPRIL 10 MG/1
10 TABLET ORAL DAILY
Qty: 90 TABLET | Refills: 1 | Status: SHIPPED | OUTPATIENT
Start: 2019-12-24 | End: 2020-01-10

## 2019-12-24 RX ORDER — MONTELUKAST SODIUM 10 MG/1
10 TABLET ORAL DAILY
Qty: 90 TABLET | Refills: 1 | Status: SHIPPED | OUTPATIENT
Start: 2019-12-24 | End: 2020-04-27 | Stop reason: SDUPTHER

## 2020-01-03 DIAGNOSIS — M79.7 FIBROMYALGIA: ICD-10-CM

## 2020-01-03 RX ORDER — DULOXETIN HYDROCHLORIDE 30 MG/1
30 CAPSULE, DELAYED RELEASE ORAL DAILY
Qty: 90 CAPSULE | Refills: 3 | Status: SHIPPED | OUTPATIENT
Start: 2020-01-03 | End: 2020-01-08 | Stop reason: SDUPTHER

## 2020-01-08 DIAGNOSIS — M79.7 FIBROMYALGIA: ICD-10-CM

## 2020-01-08 RX ORDER — DULOXETIN HYDROCHLORIDE 30 MG/1
30 CAPSULE, DELAYED RELEASE ORAL DAILY
Qty: 90 CAPSULE | Refills: 3 | Status: SHIPPED | OUTPATIENT
Start: 2020-01-08 | End: 2020-01-15 | Stop reason: SDUPTHER

## 2020-01-09 DIAGNOSIS — I10 ESSENTIAL HYPERTENSION: ICD-10-CM

## 2020-01-10 RX ORDER — LISINOPRIL 10 MG/1
TABLET ORAL
Qty: 90 TABLET | Refills: 0 | Status: SHIPPED | OUTPATIENT
Start: 2020-01-10 | End: 2020-02-13 | Stop reason: HOSPADM

## 2020-01-13 NOTE — DISCHARGE INSTRUCTIONS
Direct Laryngoscopy   WHAT YOU SHOULD KNOW:   During a direct laryngoscopy, your caregiver places a scope into your mouth to see directly inside your throat  You may need a direct laryngoscopy to find injuries, growths, tumors, or other problems in your larynx (voice box) or vocal cords  Direct laryngoscopy helps your caregiver diagnose your condition and create a treatment plan  You might also have surgery or other treatments during a direct laryngoscopy  AFTER YOU LEAVE:   Voice care: Your voice may sound hoarse after a laryngoscopy  Try to rest your voice  Speak softly, but do not whisper  Keep conversations short  Do not shout  Follow up with your primary healthcare provider or specialist as directed:  Write down your questions so you remember to ask them during your visits  Medicines:   · Keep a current list of your medicines:  Include the amounts, and when, how, and why you take them  Take the list or the pill bottles to follow-up visits  Carry your medicine list with you in case of an emergency  Throw away old medicine lists  Use vitamins, herbs, or food supplements only as directed  · Take your medicine as directed:  Call your healthcare provider if you think your medicine is not working as expected  Tell him about any medicine allergies, and if you want to quit taking or change your medicine  Nutrition:  Most people eat and drink as usual after a laryngoscopy  Ask your primary healthcare provider if you are not sure  Contact your primary healthcare provider if:  · You have problems breathing or talking  · You see new injuries to your teeth, lips, or tongue  · Your pain does not go away or gets worse  · You have questions about your procedure, medicine, or care      Office Jorge Hinds [FreeTextEntry1] : Gradual return to normal activities. \par Followup as needed

## 2020-01-15 DIAGNOSIS — M79.7 FIBROMYALGIA: ICD-10-CM

## 2020-01-15 RX ORDER — DULOXETIN HYDROCHLORIDE 60 MG/1
60 CAPSULE, DELAYED RELEASE ORAL DAILY
Qty: 90 CAPSULE | Refills: 3 | Status: SHIPPED | OUTPATIENT
Start: 2020-01-15 | End: 2021-02-23 | Stop reason: SDUPTHER

## 2020-02-11 ENCOUNTER — HOSPITAL ENCOUNTER (OUTPATIENT)
Facility: HOSPITAL | Age: 50
Setting detail: OBSERVATION
Discharge: HOME | End: 2020-02-13
Attending: EMERGENCY MEDICINE | Admitting: INTERNAL MEDICINE
Payer: COMMERCIAL

## 2020-02-11 ENCOUNTER — APPOINTMENT (EMERGENCY)
Dept: CT IMAGING | Facility: HOSPITAL | Age: 50
End: 2020-02-11
Payer: COMMERCIAL

## 2020-02-11 DIAGNOSIS — M79.7 FIBROMYALGIA: ICD-10-CM

## 2020-02-11 DIAGNOSIS — K65.4 SCLEROSING MESENTERITIS (HCC): ICD-10-CM

## 2020-02-11 DIAGNOSIS — R13.19 OTHER DYSPHAGIA: ICD-10-CM

## 2020-02-11 DIAGNOSIS — R10.9 ACUTE FLANK PAIN: Primary | ICD-10-CM

## 2020-02-11 DIAGNOSIS — E66.9 OBESITY (BMI 35.0-39.9 WITHOUT COMORBIDITY): Chronic | ICD-10-CM

## 2020-02-11 DIAGNOSIS — D72.829 LEUKOCYTOSIS: ICD-10-CM

## 2020-02-11 DIAGNOSIS — I10 ESSENTIAL HYPERTENSION: ICD-10-CM

## 2020-02-11 DIAGNOSIS — K21.9 GASTROESOPHAGEAL REFLUX DISEASE WITHOUT ESOPHAGITIS: ICD-10-CM

## 2020-02-11 DIAGNOSIS — Z72.0 TOBACCO ABUSE: Chronic | ICD-10-CM

## 2020-02-11 DIAGNOSIS — N20.0 NEPHROLITHIASIS: ICD-10-CM

## 2020-02-11 DIAGNOSIS — R79.89 ELEVATED LFTS: ICD-10-CM

## 2020-02-11 LAB
ALBUMIN SERPL BCP-MCNC: 3.6 G/DL (ref 3.5–5)
ALP SERPL-CCNC: 165 U/L (ref 46–116)
ALT SERPL W P-5'-P-CCNC: 29 U/L (ref 12–78)
ANION GAP SERPL CALCULATED.3IONS-SCNC: 8 MMOL/L (ref 4–13)
AST SERPL W P-5'-P-CCNC: 49 U/L (ref 5–45)
BASOPHILS # BLD AUTO: 0.08 THOUSANDS/ΜL (ref 0–0.1)
BASOPHILS NFR BLD AUTO: 1 % (ref 0–1)
BILIRUB SERPL-MCNC: 0.4 MG/DL (ref 0.2–1)
BILIRUB UR QL STRIP: NEGATIVE
BUN SERPL-MCNC: 13 MG/DL (ref 5–25)
CALCIUM SERPL-MCNC: 9 MG/DL (ref 8.3–10.1)
CHLORIDE SERPL-SCNC: 106 MMOL/L (ref 100–108)
CLARITY UR: CLEAR
CO2 SERPL-SCNC: 27 MMOL/L (ref 21–32)
COLOR UR: NORMAL
CREAT SERPL-MCNC: 0.63 MG/DL (ref 0.6–1.3)
EOSINOPHIL # BLD AUTO: 0.31 THOUSAND/ΜL (ref 0–0.61)
EOSINOPHIL NFR BLD AUTO: 2 % (ref 0–6)
ERYTHROCYTE [DISTWIDTH] IN BLOOD BY AUTOMATED COUNT: 13.2 % (ref 11.6–15.1)
GFR SERPL CREATININE-BSD FRML MDRD: 106 ML/MIN/1.73SQ M
GLUCOSE SERPL-MCNC: 93 MG/DL (ref 65–140)
GLUCOSE UR STRIP-MCNC: NEGATIVE MG/DL
HCT VFR BLD AUTO: 44.8 % (ref 34.8–46.1)
HGB BLD-MCNC: 14.8 G/DL (ref 11.5–15.4)
HGB UR QL STRIP.AUTO: NEGATIVE
IMM GRANULOCYTES # BLD AUTO: 0.17 THOUSAND/UL (ref 0–0.2)
IMM GRANULOCYTES NFR BLD AUTO: 1 % (ref 0–2)
KETONES UR STRIP-MCNC: NEGATIVE MG/DL
LEUKOCYTE ESTERASE UR QL STRIP: NEGATIVE
LYMPHOCYTES # BLD AUTO: 2.38 THOUSANDS/ΜL (ref 0.6–4.47)
LYMPHOCYTES NFR BLD AUTO: 19 % (ref 14–44)
MCH RBC QN AUTO: 31.2 PG (ref 26.8–34.3)
MCHC RBC AUTO-ENTMCNC: 33 G/DL (ref 31.4–37.4)
MCV RBC AUTO: 95 FL (ref 82–98)
MONOCYTES # BLD AUTO: 0.71 THOUSAND/ΜL (ref 0.17–1.22)
MONOCYTES NFR BLD AUTO: 6 % (ref 4–12)
NEUTROPHILS # BLD AUTO: 9.23 THOUSANDS/ΜL (ref 1.85–7.62)
NEUTS SEG NFR BLD AUTO: 71 % (ref 43–75)
NITRITE UR QL STRIP: NEGATIVE
NRBC BLD AUTO-RTO: 0 /100 WBCS
PH UR STRIP.AUTO: 6.5 [PH]
PLATELET # BLD AUTO: 382 THOUSANDS/UL (ref 149–390)
PMV BLD AUTO: 11.2 FL (ref 8.9–12.7)
POTASSIUM SERPL-SCNC: 5.7 MMOL/L (ref 3.5–5.3)
PROT SERPL-MCNC: 8.2 G/DL (ref 6.4–8.2)
PROT UR STRIP-MCNC: NEGATIVE MG/DL
RBC # BLD AUTO: 4.74 MILLION/UL (ref 3.81–5.12)
SODIUM SERPL-SCNC: 141 MMOL/L (ref 136–145)
SP GR UR STRIP.AUTO: 1.01 (ref 1–1.03)
UROBILINOGEN UR QL STRIP.AUTO: 0.2 E.U./DL
WBC # BLD AUTO: 12.88 THOUSAND/UL (ref 4.31–10.16)

## 2020-02-11 PROCEDURE — 99219 PR INITIAL OBSERVATION CARE/DAY 50 MINUTES: CPT | Performed by: NURSE PRACTITIONER

## 2020-02-11 PROCEDURE — 74176 CT ABD & PELVIS W/O CONTRAST: CPT

## 2020-02-11 PROCEDURE — 80053 COMPREHEN METABOLIC PANEL: CPT | Performed by: EMERGENCY MEDICINE

## 2020-02-11 PROCEDURE — 96374 THER/PROPH/DIAG INJ IV PUSH: CPT

## 2020-02-11 PROCEDURE — 96375 TX/PRO/DX INJ NEW DRUG ADDON: CPT

## 2020-02-11 PROCEDURE — 36415 COLL VENOUS BLD VENIPUNCTURE: CPT | Performed by: EMERGENCY MEDICINE

## 2020-02-11 PROCEDURE — 96361 HYDRATE IV INFUSION ADD-ON: CPT

## 2020-02-11 PROCEDURE — 99285 EMERGENCY DEPT VISIT HI MDM: CPT | Performed by: EMERGENCY MEDICINE

## 2020-02-11 PROCEDURE — 85025 COMPLETE CBC W/AUTO DIFF WBC: CPT | Performed by: EMERGENCY MEDICINE

## 2020-02-11 PROCEDURE — 81003 URINALYSIS AUTO W/O SCOPE: CPT | Performed by: EMERGENCY MEDICINE

## 2020-02-11 PROCEDURE — 99285 EMERGENCY DEPT VISIT HI MDM: CPT

## 2020-02-11 RX ORDER — LISINOPRIL 10 MG/1
10 TABLET ORAL DAILY
Status: DISCONTINUED | OUTPATIENT
Start: 2020-02-12 | End: 2020-02-12

## 2020-02-11 RX ORDER — ONDANSETRON 2 MG/ML
4 INJECTION INTRAMUSCULAR; INTRAVENOUS ONCE
Status: COMPLETED | OUTPATIENT
Start: 2020-02-11 | End: 2020-02-11

## 2020-02-11 RX ORDER — HYDROMORPHONE HCL/PF 1 MG/ML
0.5 SYRINGE (ML) INJECTION
Status: DISCONTINUED | OUTPATIENT
Start: 2020-02-11 | End: 2020-02-12

## 2020-02-11 RX ORDER — METOPROLOL SUCCINATE 50 MG/1
50 TABLET, EXTENDED RELEASE ORAL DAILY
Status: DISCONTINUED | OUTPATIENT
Start: 2020-02-12 | End: 2020-02-12

## 2020-02-11 RX ORDER — FENTANYL CITRATE 50 UG/ML
100 INJECTION, SOLUTION INTRAMUSCULAR; INTRAVENOUS ONCE
Status: COMPLETED | OUTPATIENT
Start: 2020-02-11 | End: 2020-02-11

## 2020-02-11 RX ORDER — PREDNISONE 20 MG/1
40 TABLET ORAL DAILY
Status: DISCONTINUED | OUTPATIENT
Start: 2020-02-12 | End: 2020-02-12

## 2020-02-11 RX ORDER — TERBINAFINE HYDROCHLORIDE 250 MG/1
250 TABLET ORAL DAILY
COMMUNITY
Start: 2020-01-14 | End: 2020-11-17 | Stop reason: ALTCHOICE

## 2020-02-11 RX ORDER — MONTELUKAST SODIUM 10 MG/1
10 TABLET ORAL DAILY
Status: DISCONTINUED | OUTPATIENT
Start: 2020-02-12 | End: 2020-02-13 | Stop reason: HOSPADM

## 2020-02-11 RX ORDER — DULOXETIN HYDROCHLORIDE 30 MG/1
60 CAPSULE, DELAYED RELEASE ORAL DAILY
Status: DISCONTINUED | OUTPATIENT
Start: 2020-02-12 | End: 2020-02-13 | Stop reason: HOSPADM

## 2020-02-11 RX ORDER — HYDROMORPHONE HCL/PF 1 MG/ML
0.5 SYRINGE (ML) INJECTION ONCE
Status: COMPLETED | OUTPATIENT
Start: 2020-02-11 | End: 2020-02-11

## 2020-02-11 RX ORDER — GABAPENTIN 300 MG/1
600 CAPSULE ORAL 2 TIMES DAILY
Status: DISCONTINUED | OUTPATIENT
Start: 2020-02-12 | End: 2020-02-12

## 2020-02-11 RX ORDER — KETOROLAC TROMETHAMINE 30 MG/ML
30 INJECTION, SOLUTION INTRAMUSCULAR; INTRAVENOUS ONCE
Status: COMPLETED | OUTPATIENT
Start: 2020-02-11 | End: 2020-02-11

## 2020-02-11 RX ORDER — ACETAMINOPHEN 325 MG/1
650 TABLET ORAL EVERY 6 HOURS PRN
Status: DISCONTINUED | OUTPATIENT
Start: 2020-02-11 | End: 2020-02-13 | Stop reason: HOSPADM

## 2020-02-11 RX ORDER — ONDANSETRON 2 MG/ML
4 INJECTION INTRAMUSCULAR; INTRAVENOUS EVERY 6 HOURS PRN
Status: DISCONTINUED | OUTPATIENT
Start: 2020-02-11 | End: 2020-02-13 | Stop reason: HOSPADM

## 2020-02-11 RX ORDER — TAMOXIFEN CITRATE 10 MG/1
10 TABLET ORAL 2 TIMES DAILY
Status: DISCONTINUED | OUTPATIENT
Start: 2020-02-12 | End: 2020-02-13

## 2020-02-11 RX ORDER — CELECOXIB 200 MG/1
200 CAPSULE ORAL 2 TIMES DAILY
COMMUNITY
Start: 2017-07-22 | End: 2020-02-13 | Stop reason: HOSPADM

## 2020-02-11 RX ADMIN — FENTANYL CITRATE 100 MCG: 50 INJECTION, SOLUTION INTRAMUSCULAR; INTRAVENOUS at 20:59

## 2020-02-11 RX ADMIN — SODIUM CHLORIDE 1000 ML: 0.9 INJECTION, SOLUTION INTRAVENOUS at 20:30

## 2020-02-11 RX ADMIN — KETOROLAC TROMETHAMINE 30 MG: 30 INJECTION, SOLUTION INTRAMUSCULAR; INTRAVENOUS at 20:41

## 2020-02-11 RX ADMIN — HYDROMORPHONE HYDROCHLORIDE 0.5 MG: 1 INJECTION, SOLUTION INTRAMUSCULAR; INTRAVENOUS; SUBCUTANEOUS at 21:50

## 2020-02-11 RX ADMIN — ONDANSETRON 4 MG: 2 INJECTION INTRAMUSCULAR; INTRAVENOUS at 20:42

## 2020-02-12 ENCOUNTER — APPOINTMENT (OUTPATIENT)
Dept: CT IMAGING | Facility: HOSPITAL | Age: 50
End: 2020-02-12
Payer: COMMERCIAL

## 2020-02-12 PROBLEM — Z72.0 TOBACCO ABUSE: Chronic | Status: ACTIVE | Noted: 2020-02-12

## 2020-02-12 PROBLEM — E66.01 CLASS 3 SEVERE OBESITY DUE TO EXCESS CALORIES WITHOUT SERIOUS COMORBIDITY WITH BODY MASS INDEX (BMI) OF 40.0 TO 44.9 IN ADULT (HCC): Status: RESOLVED | Noted: 2019-08-01 | Resolved: 2020-02-12

## 2020-02-12 PROBLEM — R79.89 ELEVATED LFTS: Status: ACTIVE | Noted: 2020-02-12

## 2020-02-12 PROBLEM — E66.9 OBESITY (BMI 35.0-39.9 WITHOUT COMORBIDITY): Chronic | Status: ACTIVE | Noted: 2020-02-12

## 2020-02-12 LAB
ALBUMIN SERPL BCP-MCNC: 3.3 G/DL (ref 3.5–5)
ALBUMIN SERPL BCP-MCNC: 3.4 G/DL (ref 3.5–5)
ALP SERPL-CCNC: 182 U/L (ref 46–116)
ALP SERPL-CCNC: 185 U/L (ref 46–116)
ALT SERPL W P-5'-P-CCNC: 77 U/L (ref 12–78)
ALT SERPL W P-5'-P-CCNC: 91 U/L (ref 12–78)
ANION GAP SERPL CALCULATED.3IONS-SCNC: 8 MMOL/L (ref 4–13)
ANION GAP SERPL CALCULATED.3IONS-SCNC: 9 MMOL/L (ref 4–13)
AST SERPL W P-5'-P-CCNC: 143 U/L (ref 5–45)
AST SERPL W P-5'-P-CCNC: 67 U/L (ref 5–45)
BASOPHILS # BLD AUTO: 0.04 THOUSANDS/ΜL (ref 0–0.1)
BASOPHILS NFR BLD AUTO: 0 % (ref 0–1)
BILIRUB DIRECT SERPL-MCNC: 0.09 MG/DL (ref 0–0.2)
BILIRUB SERPL-MCNC: 0.2 MG/DL (ref 0.2–1)
BILIRUB SERPL-MCNC: 0.3 MG/DL (ref 0.2–1)
BUN SERPL-MCNC: 11 MG/DL (ref 5–25)
BUN SERPL-MCNC: 9 MG/DL (ref 5–25)
CALCIUM SERPL-MCNC: 8.9 MG/DL (ref 8.3–10.1)
CALCIUM SERPL-MCNC: 9.1 MG/DL (ref 8.3–10.1)
CHLORIDE SERPL-SCNC: 104 MMOL/L (ref 100–108)
CHLORIDE SERPL-SCNC: 106 MMOL/L (ref 100–108)
CO2 SERPL-SCNC: 27 MMOL/L (ref 21–32)
CO2 SERPL-SCNC: 27 MMOL/L (ref 21–32)
CREAT SERPL-MCNC: 0.74 MG/DL (ref 0.6–1.3)
CREAT SERPL-MCNC: 0.82 MG/DL (ref 0.6–1.3)
CRP SERPL QL: 6.5 MG/L
EOSINOPHIL # BLD AUTO: 0.03 THOUSAND/ΜL (ref 0–0.61)
EOSINOPHIL NFR BLD AUTO: 0 % (ref 0–6)
ERYTHROCYTE [DISTWIDTH] IN BLOOD BY AUTOMATED COUNT: 13.2 % (ref 11.6–15.1)
ERYTHROCYTE [DISTWIDTH] IN BLOOD BY AUTOMATED COUNT: 13.2 % (ref 11.6–15.1)
ERYTHROCYTE [SEDIMENTATION RATE] IN BLOOD: 27 MM/HOUR (ref 0–15)
GFR SERPL CREATININE-BSD FRML MDRD: 84 ML/MIN/1.73SQ M
GFR SERPL CREATININE-BSD FRML MDRD: 95 ML/MIN/1.73SQ M
GLUCOSE SERPL-MCNC: 161 MG/DL (ref 65–140)
GLUCOSE SERPL-MCNC: 229 MG/DL (ref 65–140)
HCT VFR BLD AUTO: 43 % (ref 34.8–46.1)
HCT VFR BLD AUTO: 43.8 % (ref 34.8–46.1)
HGB BLD-MCNC: 13.9 G/DL (ref 11.5–15.4)
HGB BLD-MCNC: 14.1 G/DL (ref 11.5–15.4)
IMM GRANULOCYTES # BLD AUTO: 0.15 THOUSAND/UL (ref 0–0.2)
IMM GRANULOCYTES NFR BLD AUTO: 2 % (ref 0–2)
LYMPHOCYTES # BLD AUTO: 0.78 THOUSANDS/ΜL (ref 0.6–4.47)
LYMPHOCYTES NFR BLD AUTO: 8 % (ref 14–44)
MCH RBC QN AUTO: 30.5 PG (ref 26.8–34.3)
MCH RBC QN AUTO: 31.2 PG (ref 26.8–34.3)
MCHC RBC AUTO-ENTMCNC: 31.7 G/DL (ref 31.4–37.4)
MCHC RBC AUTO-ENTMCNC: 32.8 G/DL (ref 31.4–37.4)
MCV RBC AUTO: 95 FL (ref 82–98)
MCV RBC AUTO: 96 FL (ref 82–98)
MONOCYTES # BLD AUTO: 0.23 THOUSAND/ΜL (ref 0.17–1.22)
MONOCYTES NFR BLD AUTO: 2 % (ref 4–12)
NEUTROPHILS # BLD AUTO: 8.41 THOUSANDS/ΜL (ref 1.85–7.62)
NEUTS SEG NFR BLD AUTO: 88 % (ref 43–75)
NRBC BLD AUTO-RTO: 0 /100 WBCS
PLATELET # BLD AUTO: 317 THOUSANDS/UL (ref 149–390)
PLATELET # BLD AUTO: 338 THOUSANDS/UL (ref 149–390)
PMV BLD AUTO: 11.2 FL (ref 8.9–12.7)
PMV BLD AUTO: 11.7 FL (ref 8.9–12.7)
POTASSIUM SERPL-SCNC: 4.4 MMOL/L (ref 3.5–5.3)
POTASSIUM SERPL-SCNC: 4.6 MMOL/L (ref 3.5–5.3)
PROT SERPL-MCNC: 7 G/DL (ref 6.4–8.2)
PROT SERPL-MCNC: 7.2 G/DL (ref 6.4–8.2)
RBC # BLD AUTO: 4.52 MILLION/UL (ref 3.81–5.12)
RBC # BLD AUTO: 4.55 MILLION/UL (ref 3.81–5.12)
SODIUM SERPL-SCNC: 140 MMOL/L (ref 136–145)
SODIUM SERPL-SCNC: 141 MMOL/L (ref 136–145)
WBC # BLD AUTO: 13.3 THOUSAND/UL (ref 4.31–10.16)
WBC # BLD AUTO: 9.64 THOUSAND/UL (ref 4.31–10.16)

## 2020-02-12 PROCEDURE — 80076 HEPATIC FUNCTION PANEL: CPT | Performed by: INTERNAL MEDICINE

## 2020-02-12 PROCEDURE — 85025 COMPLETE CBC W/AUTO DIFF WBC: CPT | Performed by: NURSE PRACTITIONER

## 2020-02-12 PROCEDURE — 80053 COMPREHEN METABOLIC PANEL: CPT | Performed by: INTERNAL MEDICINE

## 2020-02-12 PROCEDURE — 99244 OFF/OP CNSLTJ NEW/EST MOD 40: CPT | Performed by: SURGERY

## 2020-02-12 PROCEDURE — 85027 COMPLETE CBC AUTOMATED: CPT | Performed by: INTERNAL MEDICINE

## 2020-02-12 PROCEDURE — 74177 CT ABD & PELVIS W/CONTRAST: CPT

## 2020-02-12 PROCEDURE — 80048 BASIC METABOLIC PNL TOTAL CA: CPT | Performed by: NURSE PRACTITIONER

## 2020-02-12 PROCEDURE — 99244 OFF/OP CNSLTJ NEW/EST MOD 40: CPT | Performed by: INTERNAL MEDICINE

## 2020-02-12 PROCEDURE — 99232 SBSQ HOSP IP/OBS MODERATE 35: CPT | Performed by: INTERNAL MEDICINE

## 2020-02-12 PROCEDURE — 86140 C-REACTIVE PROTEIN: CPT | Performed by: NURSE PRACTITIONER

## 2020-02-12 PROCEDURE — 85652 RBC SED RATE AUTOMATED: CPT | Performed by: NURSE PRACTITIONER

## 2020-02-12 RX ORDER — SENNOSIDES 8.6 MG
1 TABLET ORAL
Status: DISCONTINUED | OUTPATIENT
Start: 2020-02-12 | End: 2020-02-13 | Stop reason: HOSPADM

## 2020-02-12 RX ORDER — PREDNISONE 20 MG/1
40 TABLET ORAL DAILY
Status: DISCONTINUED | OUTPATIENT
Start: 2020-02-12 | End: 2020-02-13

## 2020-02-12 RX ORDER — METOPROLOL SUCCINATE 50 MG/1
50 TABLET, EXTENDED RELEASE ORAL DAILY
Status: DISCONTINUED | OUTPATIENT
Start: 2020-02-12 | End: 2020-02-13 | Stop reason: HOSPADM

## 2020-02-12 RX ORDER — LISINOPRIL 20 MG/1
20 TABLET ORAL DAILY
Status: DISCONTINUED | OUTPATIENT
Start: 2020-02-13 | End: 2020-02-13 | Stop reason: HOSPADM

## 2020-02-12 RX ORDER — CELECOXIB 100 MG/1
200 CAPSULE ORAL 2 TIMES DAILY
Status: DISCONTINUED | OUTPATIENT
Start: 2020-02-12 | End: 2020-02-12

## 2020-02-12 RX ORDER — PANTOPRAZOLE SODIUM 40 MG/1
40 TABLET, DELAYED RELEASE ORAL
Status: DISCONTINUED | OUTPATIENT
Start: 2020-02-12 | End: 2020-02-13 | Stop reason: HOSPADM

## 2020-02-12 RX ORDER — DOCUSATE SODIUM 100 MG/1
100 CAPSULE, LIQUID FILLED ORAL 2 TIMES DAILY
Status: DISCONTINUED | OUTPATIENT
Start: 2020-02-12 | End: 2020-02-13 | Stop reason: HOSPADM

## 2020-02-12 RX ORDER — HYDROMORPHONE HCL/PF 1 MG/ML
0.5 SYRINGE (ML) INJECTION EVERY 2 HOUR PRN
Status: DISCONTINUED | OUTPATIENT
Start: 2020-02-12 | End: 2020-02-13 | Stop reason: HOSPADM

## 2020-02-12 RX ORDER — GABAPENTIN 300 MG/1
600 CAPSULE ORAL 2 TIMES DAILY
Status: DISCONTINUED | OUTPATIENT
Start: 2020-02-12 | End: 2020-02-13 | Stop reason: HOSPADM

## 2020-02-12 RX ORDER — NICOTINE 21 MG/24HR
14 PATCH, TRANSDERMAL 24 HOURS TRANSDERMAL DAILY
Status: DISCONTINUED | OUTPATIENT
Start: 2020-02-12 | End: 2020-02-12

## 2020-02-12 RX ADMIN — LISINOPRIL 10 MG: 10 TABLET ORAL at 10:04

## 2020-02-12 RX ADMIN — HYDROMORPHONE HYDROCHLORIDE 0.5 MG: 1 INJECTION, SOLUTION INTRAMUSCULAR; INTRAVENOUS; SUBCUTANEOUS at 12:40

## 2020-02-12 RX ADMIN — NICOTINE 14 MG: 14 PATCH TRANSDERMAL at 10:11

## 2020-02-12 RX ADMIN — DOCUSATE SODIUM 100 MG: 100 CAPSULE, LIQUID FILLED ORAL at 17:06

## 2020-02-12 RX ADMIN — METOPROLOL SUCCINATE 50 MG: 50 TABLET, EXTENDED RELEASE ORAL at 00:35

## 2020-02-12 RX ADMIN — CELECOXIB 200 MG: 100 CAPSULE ORAL at 10:10

## 2020-02-12 RX ADMIN — IOHEXOL 100 ML: 350 INJECTION, SOLUTION INTRAVENOUS at 12:01

## 2020-02-12 RX ADMIN — HYDROMORPHONE HYDROCHLORIDE 0.5 MG: 1 INJECTION, SOLUTION INTRAMUSCULAR; INTRAVENOUS; SUBCUTANEOUS at 04:43

## 2020-02-12 RX ADMIN — HYDROMORPHONE HYDROCHLORIDE 0.5 MG: 1 INJECTION, SOLUTION INTRAMUSCULAR; INTRAVENOUS; SUBCUTANEOUS at 07:18

## 2020-02-12 RX ADMIN — GABAPENTIN 600 MG: 300 CAPSULE ORAL at 00:35

## 2020-02-12 RX ADMIN — HYDROMORPHONE HYDROCHLORIDE 0.5 MG: 1 INJECTION, SOLUTION INTRAMUSCULAR; INTRAVENOUS; SUBCUTANEOUS at 10:01

## 2020-02-12 RX ADMIN — TAMOXIFEN CITRATE 10 MG: 10 TABLET, FILM COATED ORAL at 17:06

## 2020-02-12 RX ADMIN — HYDROMORPHONE HYDROCHLORIDE 0.5 MG: 1 INJECTION, SOLUTION INTRAMUSCULAR; INTRAVENOUS; SUBCUTANEOUS at 22:24

## 2020-02-12 RX ADMIN — DOCUSATE SODIUM 100 MG: 100 CAPSULE, LIQUID FILLED ORAL at 10:04

## 2020-02-12 RX ADMIN — DULOXETINE 60 MG: 30 CAPSULE, DELAYED RELEASE ORAL at 10:04

## 2020-02-12 RX ADMIN — TAMOXIFEN CITRATE 10 MG: 10 TABLET, FILM COATED ORAL at 11:24

## 2020-02-12 RX ADMIN — GABAPENTIN 600 MG: 300 CAPSULE ORAL at 10:22

## 2020-02-12 RX ADMIN — MONTELUKAST 10 MG: 10 TABLET, FILM COATED ORAL at 10:04

## 2020-02-12 RX ADMIN — HYDROMORPHONE HYDROCHLORIDE 0.5 MG: 1 INJECTION, SOLUTION INTRAMUSCULAR; INTRAVENOUS; SUBCUTANEOUS at 02:14

## 2020-02-12 RX ADMIN — SENNOSIDES 8.6 MG: 8.6 TABLET, FILM COATED ORAL at 21:33

## 2020-02-12 RX ADMIN — PREDNISONE 40 MG: 20 TABLET ORAL at 10:11

## 2020-02-12 RX ADMIN — PREDNISONE 40 MG: 20 TABLET ORAL at 00:34

## 2020-02-12 RX ADMIN — CELECOXIB 200 MG: 100 CAPSULE ORAL at 00:35

## 2020-02-12 RX ADMIN — PANTOPRAZOLE SODIUM 40 MG: 40 TABLET, DELAYED RELEASE ORAL at 10:04

## 2020-02-12 RX ADMIN — NICOTINE 14 MG: 14 PATCH TRANSDERMAL at 00:48

## 2020-02-12 RX ADMIN — HYDROMORPHONE HYDROCHLORIDE 0.5 MG: 1 INJECTION, SOLUTION INTRAMUSCULAR; INTRAVENOUS; SUBCUTANEOUS at 00:09

## 2020-02-12 RX ADMIN — HYDROMORPHONE HYDROCHLORIDE 0.5 MG: 1 INJECTION, SOLUTION INTRAMUSCULAR; INTRAVENOUS; SUBCUTANEOUS at 19:58

## 2020-02-12 RX ADMIN — HYDROMORPHONE HYDROCHLORIDE 0.5 MG: 1 INJECTION, SOLUTION INTRAMUSCULAR; INTRAVENOUS; SUBCUTANEOUS at 17:04

## 2020-02-12 RX ADMIN — METOPROLOL SUCCINATE 50 MG: 50 TABLET, EXTENDED RELEASE ORAL at 20:04

## 2020-02-12 RX ADMIN — GABAPENTIN 600 MG: 300 CAPSULE ORAL at 17:06

## 2020-02-12 NOTE — PLAN OF CARE
Problem: PAIN - ADULT  Goal: Verbalizes/displays adequate comfort level or baseline comfort level  Description  Interventions:  - Encourage patient to monitor pain and request assistance  - Assess pain using appropriate pain scale  - Administer analgesics based on type and severity of pain and evaluate response  - Implement non-pharmacological measures as appropriate and evaluate response  - Consider cultural and social influences on pain and pain management  - Notify physician/advanced practitioner if interventions unsuccessful or patient reports new pain  Outcome: Progressing     Problem: INFECTION - ADULT  Goal: Absence or prevention of progression during hospitalization  Description  INTERVENTIONS:  - Assess and monitor for signs and symptoms of infection  - Monitor lab/diagnostic results  - Monitor all insertion sites, i e  indwelling lines, tubes, and drains  - Monitor endotracheal if appropriate and nasal secretions for changes in amount and color  - [place] appropriate cooling/warming therapies per order  - Administer medications as ordered  - Instruct and encourage patient and family to use good hand hygiene technique  - Identify and instruct in appropriate isolation precautions for identified infection/condition  Outcome: Progressing  Goal: Absence of fever/infection during neutropenic period  Description  INTERVENTIONS:  - Monitor WBC    Outcome: Progressing     Problem: SAFETY ADULT  Goal: Patient will remain free of falls  Description  INTERVENTIONS:  - Assess patient frequently for physical needs  -  Identify cognitive and physical deficits and behaviors that affect risk of falls    -  [place] fall precautions as indicated by assessment   - Educate patient/family on patient safety including physical limitations  - Instruct patient to call for assistance with activity based on assessment  - Modify environment to reduce risk of injury  - Consider OT/PT consult to assist with strengthening/mobility  Outcome: Progressing  Goal: Maintain or return to baseline ADL function  Description  INTERVENTIONS:  -  Assess patient's ability to carry out ADLs; assess patient's baseline for ADL function and identify physical deficits which impact ability to perform ADLs (bathing, care of mouth/teeth, toileting, grooming, dressing, etc )  - Assess/evaluate cause of self-care deficits   - Assess range of motion  - Assess patient's mobility; develop plan if impaired  - Assess patient's need for assistive devices and provide as appropriate  - Encourage maximum independence but intervene and supervise when necessary  - Involve family in performance of ADLs  - Assess for home care needs following discharge   - Consider OT consult to assist with ADL evaluation and planning for discharge  - Provide patient education as appropriate  Outcome: Progressing  Goal: Maintain or return mobility status to optimal level  Description  INTERVENTIONS:  - Assess patient's baseline mobility status (ambulation, transfers, stairs, etc )    - Identify cognitive and physical deficits and behaviors that affect mobility  - Identify mobility aids required to assist with transfers and/or ambulation (gait belt, sit-to-stand, lift, walker, cane, etc )  - [place] fall precautions as indicated by assessment  - Record patient progress and toleration of activity level on Mobility SBAR; progress patient to next Phase/Stage  - Instruct patient to call for assistance with activity based on assessment  - Consider rehabilitation consult to assist with strengthening/weightbearing, etc   Outcome: Progressing     Problem: DISCHARGE PLANNING  Goal: Discharge to home or other facility with appropriate resources  Description  INTERVENTIONS:  - Identify barriers to discharge w/patient and caregiver  - Arrange for needed discharge resources and transportation as appropriate  - Identify discharge learning needs (meds, wound care, etc )  - Arrange for interpretive services to assist at discharge as needed  - Refer to Case Management Department for coordinating discharge planning if the patient needs post-hospital services based on physician/advanced practitioner order or complex needs related to functional status, cognitive ability, or social support system  Outcome: Progressing     Problem: Knowledge Deficit  Goal: Patient/family/caregiver demonstrates understanding of disease process, treatment plan, medications, and discharge instructions  Description  Complete learning assessment and assess knowledge base    Interventions:  - Provide teaching at level of understanding  - Provide teaching via preferred learning methods  Outcome: Progressing

## 2020-02-12 NOTE — UTILIZATION REVIEW
Initial Clinical Review    Admission: Date/Time/Statement: Admission Orders (From admission, onward)     Ordered        02/11/20 2241  Place in Observation (expected length of stay for this patient is less than two midnights)  Once                   Orders Placed This Encounter   Procedures    Place in Observation (expected length of stay for this patient is less than two midnights)     Standing Status:   Standing     Number of Occurrences:   1     Order Specific Question:   Admitting Physician     Answer:   Nell Peres     Order Specific Question:   Level of Care     Answer:   Med Surg [16]     ED Arrival Information     Expected Arrival Acuity Means of Arrival Escorted By Service Admission Type    - 2/11/2020 19:54 Less Urgent Walk-In Self Hospitalist Urgent    Arrival Complaint    Back pain        Chief Complaint   Patient presents with    Back Pain     Right low to mid back pain started 2 hours ago; denies injury; describes as spasms  Assessment/Plan:  this is a 52year old female from home to ED admitted to observation due to acute flank pain/sclerosing mesenteritis  Presented due right flank pain for last 2 hours  On exam distressed  No reproducible flank pain  AST 49  Wbc 12 88  CT showed stranding in central mesentery  Pian control, initiation of prednisone and Tamoxifen  in progress  GI and surgery consulted  On 2/12 observation continues- has some right flank pain, controlled with IV dilaudid  On exam has posterior tenderness to right flank  CT abdomen pending  2/12/2020 per GI - has severe abdominal pain, unclear if secondary to sclerosing mesenteritis  Has extensive surgical history, so could be from intermittent ileus, SBO or adhesions  Has known fibromyalgia with extensive chronic pain issues  Plan is pain control, contrast CT, ESR and CRP       2/12/2020 per surgery: right flank pain is improving   CT abnormality could be to transient finding or scar tissue related to previous gastric bypass  On  steroids and Tamoxifen  Would recommend CT in few weeks to see if still present  No surgical intervention    ED Triage Vitals   Temperature Pulse Respirations Blood Pressure SpO2   02/11/20 2004 02/11/20 2004 02/11/20 2004 02/11/20 2004 02/11/20 2004   98 1 °F (36 7 °C) 75 15 (!) 180/97 98 %      Temp Source Heart Rate Source Patient Position - Orthostatic VS BP Location FiO2 (%)   02/11/20 2213 02/11/20 2213 -- -- --   Oral Monitor         Pain Score       02/11/20 2004       Worst Possible Pain        Wt Readings from Last 1 Encounters:   02/12/20 102 kg (225 lb)     Additional Vital Signs:   02/12/20 07:29:42  97 9 °F (36 6 °C)  69  18  132/84  100  93 %     02/12/20 00:16:40  98 2 °F (36 8 °C)  87    134/87  103  91 %     02/11/20 2213  98 4 °F (36 9 °C)  77  17  160/87    95 %         Pertinent Labs/Diagnostic Test Results:   2/11/2020 CT renal stone study abdomen - Punctate nonobstructive 1 to 2 mm calculus in the upper pole of the right kidney   No hydronephrosis     2   Mild stranding within the central mesentery about small mesenteric lymph nodes, nonspecific however may be seen in setting of sclerosing mesenteritis  Results from last 7 days   Lab Units 02/12/20 02/11/20 2030   WBC Thousand/uL 9 64 12 88*   HEMOGLOBIN g/dL 13 9 14 8   HEMATOCRIT % 43 8 44 8   PLATELETS Thousands/uL 317 382   NEUTROS ABS Thousands/µL 8 41* 9 23*         Results from last 7 days   Lab Units 02/12/20 02/11/20 2030   SODIUM mmol/L 141 141   POTASSIUM mmol/L 4 6 5 7*   CHLORIDE mmol/L 106 106   CO2 mmol/L 27 27   ANION GAP mmol/L 8 8   BUN mg/dL 11 13   CREATININE mg/dL 0 74 0 63   EGFR ml/min/1 73sq m 95 106   CALCIUM mg/dL 8 9 9 0     Results from last 7 days   Lab Units 02/11/20 2030   AST U/L 49*   ALT U/L 29   ALK PHOS U/L 165*   TOTAL PROTEIN g/dL 8 2   ALBUMIN g/dL 3 6   TOTAL BILIRUBIN mg/dL 0 40     Results from last 7 days   Lab Units 02/12/20 02/11/20  2030 GLUCOSE RANDOM mg/dL 161* 93       Results from last 7 days   Lab Units 02/11/20 2042   CLARITY UA  Clear   COLOR UA  Straw   SPEC GRAV UA  1 010   PH UA  6 5   GLUCOSE UA mg/dl Negative   KETONES UA mg/dl Negative   BLOOD UA  Negative   PROTEIN UA mg/dl Negative   NITRITE UA  Negative   BILIRUBIN UA  Negative   UROBILINOGEN UA E U /dl 0 2   LEUKOCYTES UA  Negative       ED Treatment:   Medication Administration from 02/11/2020 1954 to 02/11/2020 2317       Date/Time Order Dose Route Action Comments     02/11/2020 2030 sodium chloride 0 9 % bolus 1,000 mL 1,000 mL Intravenous New Bag      02/11/2020 2042 ondansetron (ZOFRAN) injection 4 mg 4 mg Intravenous Given      02/11/2020 2041 ketorolac (TORADOL) injection 30 mg 30 mg Intravenous Given      02/11/2020 2059 fentanyl citrate (PF) 100 MCG/2ML 100 mcg 100 mcg Intravenous Given over 4 mins     02/11/2020 2150 HYDROmorphone (DILAUDID) injection 0 5 mg 0 5 mg Intravenous Given         Past Medical History:   Diagnosis Date    Allergic     Depression     GERD (gastroesophageal reflux disease)     Hypertension     PONV (postoperative nausea and vomiting)     Tachycardia      Present on Admission:   Fibromyalgia      Admitting Diagnosis: Sclerosing mesenteritis ([facility] [address] ) [K65 4]  Back pain [M54 9]  Nephrolithiasis [N20 0]  Leukocytosis [D72 829]  Acute flank pain [R10 9]  Age/Sex: 52 y o  female  Admission Orders: 2/11/2020  2241 Observation   Scheduled Medications:  Medications:  celecoxib 200 mg Oral BID   DULoxetine 60 mg Oral Daily   gabapentin 600 mg Oral BID   lisinopril 10 mg Oral Daily   metoprolol succinate 50 mg Oral Daily   montelukast 10 mg Oral Daily   nicotine 14 mg Transdermal Daily   predniSONE 40 mg Oral Daily   tamoxifen 10 mg Oral BID     Continuous IV Infusions: none     PRN Meds:  acetaminophen 650 mg Oral Q6H PRN   HYDROmorphone - used x 4 (8159, 0214, 0443, 0718, 1001,1240) 0 5 mg Intravenous Q2H PRN   ondansetron 4 mg Intravenous Q6H PRN IP CONSULT TO GASTROENTEROLOGY  IP CONSULT TO ACUTE CARE SURGERY    Network Utilization Review Department  [email or web address]  ATTENTION: Please call with any questions or concerns to [phone] and carefully listen to the prompts so that you are directed to the right person  All voicemails are confidential   Alicia Salcedojose all requests for admission clinical reviews, approved or denied determinations and any other requests to dedicated fax number below belonging to the campus where the patient is receiving treatment   List of dedicated fax numbers for the Facilities:  [address] DENIALS (Administrative/Medical Necessity) [phone]   [address] (Maternity/NICU/Pediatrics) [phone]   [facility] [phone]   Diogo Orellana [phone]   [facility] [phone]   Tunde Munson [phone]   [facility] [address] [phone]   [facility]  [phone]   [address] [facility] [address]  [address] [facility] [address] [facility] [phone]

## 2020-02-12 NOTE — ASSESSMENT & PLAN NOTE
WBC count 12 88 in ED  Pt remains afebrile  No obvious signs of infection     · Continue to monitor WBC and fever curve

## 2020-02-12 NOTE — ASSESSMENT & PLAN NOTE
WBC count 12 88 in ED  Pt remains afebrile  No obvious signs of infection     · Continue to monitor WBC and fever curve will repeat at 1500 today

## 2020-02-12 NOTE — ED PROVIDER NOTES
History  Chief Complaint   Patient presents with    Back Pain     Right low to mid back pain started 2 hours ago; denies injury; describes as spasms  53 yo F with PMH of HTN, depression, GERD presents to ED with back pain  Right flank  Acute  Sharp  No radiation  No urinary sx  Dissimilar from her chronic left buttock pain, for which she follows with [facility]  No trauma  No fevers  Takes her breath away, but no SOB/CP  No recent travel/surgery  No h/o PE/DVT  History provided by:  Patient   used: No    Flank Pain   Pain location:  R flank  Pain quality: sharp    Pain radiates to:  Does not radiate  Pain severity:  Severe  Onset quality:  Sudden  Duration:  2 hours  Timing:  Constant  Progression:  Unchanged  Chronicity:  New  Context: not recent illness, not recent travel, not suspicious food intake and not trauma    Relieved by:  Nothing  Worsened by:  Nothing  Ineffective treatments:  None tried  Associated symptoms: no chest pain, no chills, no constipation, no cough, no diarrhea, no dysuria, no fatigue, no fever, no hematemesis, no hematochezia, no hematuria, no nausea, no shortness of breath, no sore throat and no vomiting    Risk factors: multiple surgeries and obesity    Risk factors: no alcohol abuse and not pregnant        Prior to Admission Medications   Prescriptions Last Dose Informant Patient Reported? Taking?    DULoxetine (CYMBALTA) 60 mg delayed release capsule   No No   Sig: Take 1 capsule (60 mg total) by mouth daily   gabapentin (NEURONTIN) 600 MG tablet   Yes No   Si mg 2 (two) times a day 1 tab --three times daily    lisinopril (ZESTRIL) 10 mg tablet   No No   Sig: TAKE 1 TABLET BY MOUTH EVERY DAY   metoprolol succinate (TOPROL-XL) 50 mg 24 hr tablet   No No   Sig: Take 1 tablet (50 mg total) by mouth daily   montelukast (SINGULAIR) 10 mg tablet   No No   Sig: Take 1 tablet (10 mg total) by mouth daily      Facility-Administered Medications: None       Past Medical History:   Diagnosis Date    Allergic     Depression     GERD (gastroesophageal reflux disease)     Hypertension     PONV (postoperative nausea and vomiting)     Tachycardia        Past Surgical History:   Procedure Laterality Date    ANTERIOR CRUCIATE LIGAMENT REPAIR      CHOLECYSTECTOMY      GASTRIC BYPASS      HYSTERECTOMY      INGUINAL HERNIA REPAIR      JOINT REPLACEMENT      KNEE ARTHROSCOPY Right     ORIF FINGER / THUMB FRACTURE Right     thumb surgery    MS LARYNGOSCOPY,DIRCT,OP SCOPE,BIOPSY N/A 7/12/2018    Procedure: LARYNGOSCOPY DIRECT;  Surgeon: Wendy Owens MD;  Location:  MAIN OR;  Service: ENT    PYELOPLASTY      REPLACEMENT TOTAL KNEE Right     ROTATOR CUFF REPAIR         Family History   Adopted: Yes   Problem Relation Age of Onset    Diabetes Mother     Mental illness Mother         disorder    Hypertension Mother         benign    Substance Abuse Mother     Mental illness Father         disorder    Substance Abuse Father     Mental illness Family         disorder    Substance Abuse Family      I have reviewed and agree with the history as documented  Social History     Tobacco Use    Smoking status: Former Smoker    Smokeless tobacco: Never Used   Substance Use Topics    Alcohol use: Yes     Comment: Occasional    Drug use: No       Review of Systems   Constitutional: Negative for appetite change, chills, fatigue and fever  HENT: Negative for congestion, ear pain, rhinorrhea, sore throat, trouble swallowing and voice change  Eyes: Negative for pain and visual disturbance  Respiratory: Negative for cough, chest tightness and shortness of breath  Cardiovascular: Negative for chest pain, palpitations and leg swelling  Gastrointestinal: Negative for abdominal pain, blood in stool, constipation, diarrhea, hematemesis, hematochezia, nausea and vomiting  Genitourinary: Positive for flank pain   Negative for difficulty urinating, dysuria, frequency and hematuria  Musculoskeletal: Negative for back pain, neck pain and neck stiffness  Skin: Negative for rash  Neurological: Negative for dizziness, syncope, speech difficulty, light-headedness and headaches  Psychiatric/Behavioral: Negative for confusion and suicidal ideas  Physical Exam  Physical Exam   Constitutional: She is oriented to person, place, and time  She appears well-developed and well-nourished  She appears distressed (in pain)  HENT:   Head: Normocephalic and atraumatic  Right Ear: External ear normal    Left Ear: External ear normal    Nose: Nose normal    Mouth/Throat: Oropharynx is clear and moist    Eyes: Pupils are equal, round, and reactive to light  Conjunctivae and EOM are normal  Right eye exhibits no discharge  Left eye exhibits no discharge  No scleral icterus  Neck: Normal range of motion  Neck supple  No tracheal deviation present  Cardiovascular: Normal rate, regular rhythm, normal heart sounds and intact distal pulses  Exam reveals no gallop and no friction rub  No murmur heard  Pulmonary/Chest: Effort normal and breath sounds normal  No stridor  No respiratory distress  She exhibits no tenderness  Abdominal: Soft  Bowel sounds are normal  There is no tenderness  There is no rebound and no guarding  No reproducible flank pain   Musculoskeletal: Normal range of motion  She exhibits no edema or deformity  Lymphadenopathy:     She has no cervical adenopathy  Neurological: She is alert and oriented to person, place, and time  No cranial nerve deficit  Coordination normal    Skin: Skin is warm and dry  No rash noted  She is not diaphoretic  Psychiatric: She has a normal mood and affect  Her behavior is normal    Nursing note and vitals reviewed        Vital Signs  ED Triage Vitals   Temperature Pulse Respirations Blood Pressure SpO2   02/11/20 2004 02/11/20 2004 02/11/20 2004 02/11/20 2004 02/11/20 2004   98 1 °F (36 7 °C) 75 15 (!) 180/97 98 %      Temp Source Heart Rate Source Patient Position - Orthostatic VS BP Location FiO2 (%)   02/11/20 2213 02/11/20 2213 -- -- --   Oral Monitor         Pain Score       02/11/20 2004       Worst Possible Pain           Vitals:    02/11/20 2004 02/11/20 2213   BP: (!) 180/97 160/87   Pulse: 75 77         Visual Acuity      ED Medications  Medications   sodium chloride 0 9 % bolus 1,000 mL (0 mL Intravenous Stopped 2/11/20 2150)   ondansetron (ZOFRAN) injection 4 mg (4 mg Intravenous Given 2/11/20 2042)   ketorolac (TORADOL) injection 30 mg (30 mg Intravenous Given 2/11/20 2041)   fentanyl citrate (PF) 100 MCG/2ML 100 mcg (100 mcg Intravenous Given 2/11/20 2059)   HYDROmorphone (DILAUDID) injection 0 5 mg (0 5 mg Intravenous Given 2/11/20 2150)       Diagnostic Studies  Results Reviewed     Procedure Component Value Units Date/Time    Comprehensive metabolic panel [[id number]]  (Abnormal) Collected:  02/11/20 2030    Lab Status:  Final result Specimen:  Blood from Hand, Right Updated:  02/11/20 2105     Sodium 141 mmol/L      Potassium 5 7 mmol/L      Chloride 106 mmol/L      CO2 27 mmol/L      ANION GAP 8 mmol/L      BUN 13 mg/dL      Creatinine 0 63 mg/dL      Glucose 93 mg/dL      Calcium 9 0 mg/dL      AST 49 U/L      ALT 29 U/L      Alkaline Phosphatase 165 U/L      Total Protein 8 2 g/dL      Albumin 3 6 g/dL      Total Bilirubin 0 40 mg/dL      eGFR 106 ml/min/1 73sq m     Narrative:       [place] guidelines for Chronic Kidney Disease (CKD):     Stage 1 with normal or high GFR (GFR > 90 mL/min/1 73 square meters)    Stage 2 Mild CKD (GFR = 60-89 mL/min/1 73 square meters)    Stage 3A Moderate CKD (GFR = 45-59 mL/min/1 73 square meters)    Stage 3B Moderate CKD (GFR = 30-44 mL/min/1 73 square meters)    Stage 4 Severe CKD (GFR = 15-29 mL/min/1 73 square meters)    Stage 5 End Stage CKD (GFR <15 mL/min/1 73 square meters)  Note: GFR calculation is accurate only with a steady state creatinine    UA w Reflex to Microscopic w Reflex to Culture [[id number]] Collected:  02/11/20 2042    Lab Status:  Final result Specimen:  Urine, Clean Catch Updated:  02/11/20 2051     Color, UA Straw     Clarity, UA Clear     Specific Gravity, UA 1 010     pH, UA 6 5     Leukocytes, UA Negative     Nitrite, UA Negative     Protein, UA Negative mg/dl      Glucose, UA Negative mg/dl      Ketones, UA Negative mg/dl      Urobilinogen, UA 0 2 E U /dl      Bilirubin, UA Negative     Blood, UA Negative    CBC and differential [[id number]]  (Abnormal) Collected:  02/11/20 2030    Lab Status:  Final result Specimen:  Blood from Hand, Right Updated:  02/11/20 2040     WBC 12 88 Thousand/uL      RBC 4 74 Million/uL      Hemoglobin 14 8 g/dL      Hematocrit 44 8 %      MCV 95 fL      MCH 31 2 pg      MCHC 33 0 g/dL      RDW 13 2 %      MPV 11 2 fL      Platelets 223 Thousands/uL      nRBC 0 /100 WBCs      Neutrophils Relative 71 %      Immat GRANS % 1 %      Lymphocytes Relative 19 %      Monocytes Relative 6 %      Eosinophils Relative 2 %      Basophils Relative 1 %      Neutrophils Absolute 9 23 Thousands/µL      Immature Grans Absolute 0 17 Thousand/uL      Lymphocytes Absolute 2 38 Thousands/µL      Monocytes Absolute 0 71 Thousand/µL      Eosinophils Absolute 0 31 Thousand/µL      Basophils Absolute 0 08 Thousands/µL                  CT renal stone study abdomen pelvis wo contrast   Final Result by Shimon Rios MD (02/11 2130)      1  Punctate nonobstructive 1 to 2 mm calculus in the upper pole of the right kidney  No hydronephrosis  2   Mild stranding within the central mesentery about small mesenteric lymph nodes, nonspecific however may be seen in setting of sclerosing mesenteritis  Workstation performed: [id number]                    Procedures  Procedures         ED Course  ED Course as of Feb 11 2242   Tue Feb 11, 2020 2039 Suspect nephrolithiasis rather than musculoskeletal pain  PERC 0   Will get pain controlled, labs, CT       2055 No improvement with toradol, will give fentanyl  2126 Pain 5/10, states coming back  Will give dose dilaudid  2142 Paged GI to discuss CT       2220 Attempted to page again      [phone] Discussed w/ Dr Kristal Alcaraz  He does not think there is anything acute for GI  I will admit for medicine and pain control, and if still having pain in AM recommend GI and/or surgical consult                    PERC Rule for PE      Most Recent Value   PERC Rule for PE   Age >=50  0 Filed at: 02/11/2020 2040   HR >=100  0 Filed at: 02/11/2020 2040   O2 Sat on room air < 95%  0 Filed at: 02/11/2020 2040   History of PE or DVT  0 Filed at: 02/11/2020 2040   Recent trauma or surgery  0 Filed at: 02/11/2020 2040   Hemoptysis  0 Filed at: 02/11/2020 2040   Exogenous estrogen  0 Filed at: 02/11/2020 2040   Unilateral leg swelling  0 Filed at: 02/11/2020 2040   PERC Rule for PE Results  0 Filed at: 02/11/2020 2040                      MDM  Number of Diagnoses or Management Options  Acute flank pain: new and requires workup  Leukocytosis: new and requires workup  Nephrolithiasis: new and requires workup  Sclerosing mesenteritis [facility]): new and requires workup     Amount and/or Complexity of Data Reviewed  Clinical lab tests: ordered and reviewed  Tests in the radiology section of CPT®: reviewed and ordered  Tests in the medicine section of CPT®: ordered and reviewed  Review and summarize past medical records: yes  Discuss the patient with other providers: yes  Independent visualization of images, tracings, or specimens: yes    Risk of Complications, Morbidity, and/or Mortality  Presenting problems: moderate  Diagnostic procedures: low  Management options: moderate    Patient Progress  Patient progress: improved        Disposition  Final diagnoses:   Acute flank pain   Nephrolithiasis   Leukocytosis   Sclerosing mesenteritis ([facility] [address] ) - possible     Time reflects when diagnosis was documented in both MDM as applicable and the Disposition within this note     Time User Action Codes Description Comment    2/11/2020 10:41 PM Hulan Billet S Add [R10 9] Acute flank pain     2/11/2020 10:41 PM Hulan Billet S Add [N20 0] Nephrolithiasis     2/11/2020 10:41 PM Ardell Rocker Add [D56 385] Leukocytosis     2/11/2020 10:41 PM Hulan Billet S Add [K65 4] Sclerosing mesenteritis ([address] )     2/11/2020 10:42 PM Ardell Rocker Modify [K65 4] Sclerosing mesenteritis [facility]) possible      ED Disposition     ED Disposition Condition Date/Time Comment    Admit Stable Tue Feb 11, 2020 10:41 PM Case was discussed with Clay Adams and the patient's admission status was agreed to be Admission Status: observation status to the service of Dr Terri Marie    None         Patient's Medications   Discharge Prescriptions    No medications on file     No discharge procedures on file      PDMP Review     None          ED Provider  Electronically Signed by           Yvette Nicholson MD  02/11/20 1166

## 2020-02-12 NOTE — ASSESSMENT & PLAN NOTE
· Continue cymbalta, gabapentin,   · Will hold on Celebrex as that may be causing some abdominal issues as well

## 2020-02-12 NOTE — PROGRESS NOTES
Pastoral Care Progress Note    2020  Patient: Lincoln Davis : 1970  Admission Date & Time: 2020  MRN: [id number] [facility]: [id number]                     Chaplaincy Interventions Utilized:   Empowerment: Encouraged focus on present and Provided chaplaincy education    Exploration: Explored emotional needs & resources, Explored relational needs & resources and Explored spiritual needs & resources        Relationship Building: Cultivated a relationship of care and support and Listened empathically    Chaplaincy Outcomes Achieved:  Expressed gratitude                   [facility] Affiliation None   Stress Factors   Patient Stress Factors Health changes   Coping Responses   Patient Coping Accepting; Anxiety;Open/discussion   Plan of Care   Assessment Completed by: Unit visit

## 2020-02-12 NOTE — ASSESSMENT & PLAN NOTE
CT renal stone study revealed mild stranding within central mesentery about small mesenteric lymph nodes which could be seen in sclerosing mesenteritis     · Initiate Prednisone 40mg PO daily and Tamoxifen 10mg BID  · Inpatient consult to GI  · Inpatient consult to Acute care Surgery

## 2020-02-12 NOTE — PLAN OF CARE
Problem: PAIN - ADULT  Goal: Verbalizes/displays adequate comfort level or baseline comfort level  Description  Interventions:  - Encourage patient to monitor pain and request assistance  - Assess pain using appropriate pain scale  - Administer analgesics based on type and severity of pain and evaluate response  - Implement non-pharmacological measures as appropriate and evaluate response  - Consider cultural and social influences on pain and pain management  - Notify physician/advanced practitioner if interventions unsuccessful or patient reports new pain  Outcome: Progressing     Problem: INFECTION - ADULT  Goal: Absence or prevention of progression during hospitalization  Description  INTERVENTIONS:  - Assess and monitor for signs and symptoms of infection  - Monitor lab/diagnostic results  - Monitor all insertion sites, i e  indwelling lines, tubes, and drains  - Monitor endotracheal if appropriate and nasal secretions for changes in amount and color  - [place] appropriate cooling/warming therapies per order  - Administer medications as ordered  - Instruct and encourage patient and family to use good hand hygiene technique  - Identify and instruct in appropriate isolation precautions for identified infection/condition  Outcome: Progressing  Goal: Absence of fever/infection during neutropenic period  Description  INTERVENTIONS:  - Monitor WBC    Outcome: Progressing     Problem: SAFETY ADULT  Goal: Patient will remain free of falls  Description  INTERVENTIONS:  - Assess patient frequently for physical needs  -  Identify cognitive and physical deficits and behaviors that affect risk of falls    -  [place] fall precautions as indicated by assessment   - Educate patient/family on patient safety including physical limitations  - Instruct patient to call for assistance with activity based on assessment  - Modify environment to reduce risk of injury  - Consider OT/PT consult to assist with strengthening/mobility  Outcome: Progressing  Goal: Maintain or return to baseline ADL function  Description  INTERVENTIONS:  -  Assess patient's ability to carry out ADLs; assess patient's baseline for ADL function and identify physical deficits which impact ability to perform ADLs (bathing, care of mouth/teeth, toileting, grooming, dressing, etc )  - Assess/evaluate cause of self-care deficits   - Assess range of motion  - Assess patient's mobility; develop plan if impaired  - Assess patient's need for assistive devices and provide as appropriate  - Encourage maximum independence but intervene and supervise when necessary  - Involve family in performance of ADLs  - Assess for home care needs following discharge   - Consider OT consult to assist with ADL evaluation and planning for discharge  - Provide patient education as appropriate  Outcome: Progressing  Goal: Maintain or return mobility status to optimal level  Description  INTERVENTIONS:  - Assess patient's baseline mobility status (ambulation, transfers, stairs, etc )    - Identify cognitive and physical deficits and behaviors that affect mobility  - Identify mobility aids required to assist with transfers and/or ambulation (gait belt, sit-to-stand, lift, walker, cane, etc )  - [place] fall precautions as indicated by assessment  - Record patient progress and toleration of activity level on Mobility SBAR; progress patient to next Phase/Stage  - Instruct patient to call for assistance with activity based on assessment  - Consider rehabilitation consult to assist with strengthening/weightbearing, etc   Outcome: Progressing     Problem: DISCHARGE PLANNING  Goal: Discharge to home or other facility with appropriate resources  Description  INTERVENTIONS:  - Identify barriers to discharge w/patient and caregiver  - Arrange for needed discharge resources and transportation as appropriate  - Identify discharge learning needs (meds, wound care, etc )  - Arrange for interpretive services to assist at discharge as needed  - Refer to Case Management Department for coordinating discharge planning if the patient needs post-hospital services based on physician/advanced practitioner order or complex needs related to functional status, cognitive ability, or social support system  Outcome: Progressing     Problem: Knowledge Deficit  Goal: Patient/family/caregiver demonstrates understanding of disease process, treatment plan, medications, and discharge instructions  Description  Complete learning assessment and assess knowledge base    Interventions:  - Provide teaching at level of understanding  - Provide teaching via preferred learning methods  Outcome: Progressing

## 2020-02-12 NOTE — ASSESSMENT & PLAN NOTE
CT renal stone study revealed mild stranding within central mesentery about small mesenteric lymph nodes which could be seen in sclerosing mesenteritis     · Initiate Prednisone 40mg PO daily and Tamoxifen 10mg BID  · Inpatient consult to GI will discuss with them whether to continue this plan of action  · Inpatient consult to Acute care Surgery

## 2020-02-12 NOTE — CONSULTS
Consultation - GI   Abena Neumann 52 y o  female MRN: [id number]  Unit/Bed#: -01 Encounter: [id number]    Inpatient consult to gastroenterology  Consult performed by: AUSTIN Davenport  Consult ordered by: AUSTIN Rodriguez        1  Abdominal pain, possible sclerosing mesenteritis   Continues with right mid back pain and abdominal pain this morning  CT A/P without contrast showed:     Mild stranding within the central mesentery about small mesenteric lymph nodes, nonspecific however may be seen in setting of sclerosing mesenteritis  It is difficult to determine if this is true sclerosing mesenteritis as CT was performed without contrast  Differential diagnoses include intermittent bowel obstruction given history of multiple abdominal surgeries  -will repeat CT A/P with PO and IV contrast   -will check ESR and CRP   -Pt was started on Tamoxifen 10 mg BID and Prednisone 40 mg daily   -Continue pain control PRN   -Appreciate Surgery recommendations    2  Epigastric/upper abdominal pain   3  Dysphagia   Continues with epigastric and upper abdominal pain, had been taking Omeprazole as an outpatient but has not taken since mid January  She does use Celebrex b i d  Also endorses solid dysphagia, difficulty swallowing pills and occasional early satiety  Last EGD was over 5 years ago  Differential diagnoses include: peptic ulcer disease, gastritis, esophagitis  -EGD with possible dilation tomorrow 2/13   -will add Protonix 40 mg daily     4  Elevated LFT's   AST 49, ALT 29, Alk phos 165   Pt recently started on Lamisil, just finished one month of treatment  May be related to medication  -will continue trend LFT's     Case discussed with Dr Silvana Carrasco  Chief Complaint   Patient presents with    Back Pain     Right low to mid back pain started 2 hours ago; denies injury; describes as spasms       Physician Requesting Consult: Opal Bui DO    HPI Abena Neumann is a 52y o  year old female with PMH of Murali-en-Y gastric bypass (2008), HTN, fibromyalgia, hiatal hernia repair, cholecystectomy who presents with right flank pain and periumbilical abdominal pain that started last evening  She states that pain was intermittent and severe, rated pain 10/10 at its worst and described as sharp, stabbing  She also states that pain "felt like a spasm and was worse than childbirth " Associated with SOB  Also associated with mild periumbilical pain, rates pain 3/10  She reports that over the past few years she has had a change in bowel habits and will have occasional episodes of diarrhea, sometimes will occur after eating  This occurs at least a few times per week  Last BM was 3 days ago  Also recently has noticed increased bloating  She denies any fever/chills, nausea/vomiting, rectal bleeding, melena, urinary symptoms  She previously followed with a GI physician in [place] and had an EGD and colonoscopy about 5 years ago  She does have difficulty swallowing pills  She had her tonsils removed in September 2019 at [facility], her symptoms did not improve after procedure  She also has had solid food dysphagia that has been ongoing for the past year  Denies any issues with liquids  She does endorse occasional early satiety this is been present since her gastric bypass operation but she states that it has worsened over the past few months  Her appetite and weight are stable        Historical Information   Past Medical History:   Diagnosis Date    Allergic     Depression     GERD (gastroesophageal reflux disease)     Hypertension     PONV (postoperative nausea and vomiting)     Tachycardia      Past Surgical History:   Procedure Laterality Date    ANTERIOR CRUCIATE LIGAMENT REPAIR      CHOLECYSTECTOMY      GASTRIC BYPASS      HYSTERECTOMY      INGUINAL HERNIA REPAIR      JOINT REPLACEMENT      KNEE ARTHROSCOPY Right     ORIF FINGER / THUMB FRACTURE Right     thumb surgery    [place] LARYNGOSCOPY,DIRCT,OP SCOPE,BIOPSY N/A 2018    Procedure: LARYNGOSCOPY DIRECT;  Surgeon: Ritesh Acharya MD;  Location: [facility];  Service: ENT    PYELOPLASTY      REPLACEMENT TOTAL KNEE Right     ROTATOR CUFF REPAIR       Social History   Social History     Substance and Sexual Activity   Alcohol Use Yes    Frequency: 2-3 times a week    Drinks per session: 1 or 2    Binge frequency: Never    Comment: Occasional     Social History     Substance and Sexual Activity   Drug Use No     Social History     Tobacco Use   Smoking Status Former Smoker    Packs/day: 1 00    Types: Cigarettes    Last attempt to quit: 2020    Years since quittin 0   Smokeless Tobacco Never Used     Family History   Adopted: Yes   Problem Relation Age of Onset    Diabetes Mother     Mental illness Mother         disorder    Hypertension Mother         benign    Substance Abuse Mother     Mental illness Father         disorder    Substance Abuse Father     Mental illness Family         disorder    Substance Abuse Family        Meds/Allergies   Current Facility-Administered Medications   Medication Dose Route Frequency    acetaminophen (TYLENOL) tablet 650 mg  650 mg Oral Q6H PRN    celecoxib (CeleBREX) capsule 200 mg  200 mg Oral BID    docusate sodium (COLACE) capsule 100 mg  100 mg Oral BID    DULoxetine (CYMBALTA) delayed release capsule 60 mg  60 mg Oral Daily    gabapentin (NEURONTIN) capsule 600 mg  600 mg Oral BID    HYDROmorphone (DILAUDID) injection 0 5 mg  0 5 mg Intravenous Q2H PRN    lisinopril (ZESTRIL) tablet 10 mg  10 mg Oral Daily    metoprolol succinate (TOPROL-XL) 24 hr tablet 50 mg  50 mg Oral Daily    montelukast (SINGULAIR) tablet 10 mg  10 mg Oral Daily    nicotine (NICODERM CQ) 14 mg/24hr TD 24 hr patch 14 mg  14 mg Transdermal Daily    ondansetron (ZOFRAN) injection 4 mg  4 mg Intravenous Q6H PRN    predniSONE tablet 40 mg  40 mg Oral Daily    senna (SENOKOT) tablet 8 6 mg  1 tablet Oral HS    tamoxifen (NOLVADEX) tablet 10 mg  10 mg Oral BID     Medications Prior to Admission   Medication    celecoxib (CeleBREX) 200 mg capsule    terbinafine (LamISIL) 250 mg tablet    DULoxetine (CYMBALTA) 60 mg delayed release capsule    gabapentin (NEURONTIN) 600 MG tablet    lisinopril (ZESTRIL) 10 mg tablet    metoprolol succinate (TOPROL-XL) 50 mg 24 hr tablet    montelukast (SINGULAIR) 10 mg tablet       Allergies   Allergen Reactions    Sulfamethoxazole-Trimethoprim Hives and Rash    Ferumoxytol Other (See Comments)     Caused Hypertension and hard time breathing    Sulfa Antibiotics Rash       PHYSICALEXAM  Blood pressure 132/84, pulse 69, temperature 97 9 °F (36 6 °C), resp  rate 18, height 5' 3" (1 6 m), weight 102 kg (225 lb), SpO2 93 %  Body mass index is 39 86 kg/m²  General Appearance: NAD, cooperative, alert  Eyes: Anicteric, PERRLA, EOMI  ENT:  Normocephalic, atraumatic, normal mucosa  Neck:  Supple, symmetrical, trachea midline  Resp:  Clear to auscultation bilaterally; no rales, rhonchi or wheezing; respirations unlabored   CV:  S1 S2, Regular rate and rhythm; no murmur, rub, or gallop  GI:  Soft, +diffuse abdominal pain, worse in epigastric area and RUQ, LUQ, no guarding, no rebound, normal bowel sounds; no masses, no organomegaly   Back: +right mid back pain   Rectal: Deferred  Musculoskeletal: No cyanosis, clubbing or edema  Normal ROM    Skin:  No jaundice, rashes, or lesions   Heme/Lymph: No palpable cervical lymphadenopathy  Psych: Normal affect, good eye contact  Neuro: No gross deficits, AAOx3    Lab Results   Component Value Date    CALCIUM 8 9 02/12/2020    K 4 6 02/12/2020    CO2 27 02/12/2020     02/12/2020    BUN 11 02/12/2020    CREATININE 0 74 02/12/2020     Lab Results   Component Value Date    WBC 9 64 02/12/2020    HGB 13 9 02/12/2020    HCT 43 8 02/12/2020    MCV 96 02/12/2020     02/12/2020     Lab Results   Component Value Date    ALT 29 02/11/2020    AST 49 (H) 02/11/2020    ALKPHOS 165 (H) 02/11/2020     No results found for: AMYLASE  No results found for: LIPASE  No results found for: IRON, TIBC, FERRITIN  No results found for: INR    Imaging Studies: I have personally reviewed pertinent reports  EKG, Pathology, and Other Studies: I have personally reviewed pertinent reports  REVIEW OF SYSTEMS:    CONSTITUTIONAL: Denies any fever, chills, rigors, and weight loss  HEENT: No earache or tinnitus  Denies hearing loss or visual disturbances  CARDIOVASCULAR: No chest pain or palpitations  RESPIRATORY: Denies any cough, hemoptysis, shortness of breath or dyspnea on exertion  GASTROINTESTINAL: As noted in the History of Present Illness  GENITOURINARY: No problems with urination  Denies any hematuria or  dysuria  NEUROLOGIC: No dizziness or vertigo, denies headaches  MUSCULOSKELETAL: Denies any muscle or joint pain  SKIN: Denies skin rashes or itching  ENDOCRINE: Denies excessive thirst  Denies intolerance to heat or cold  PSYCHOSOCIAL: Denies depression or anxiety  Denies any recent memory loss

## 2020-02-12 NOTE — ASSESSMENT & PLAN NOTE
Punctate 1 to 2 mm non obstructing calculus in the upper pole of the R kidney  No hydronephrosis  · No intervention needed at this time

## 2020-02-12 NOTE — PROGRESS NOTES
Brief interaction as patient was scheduled for procedure  Enough time to orient her to pastoral care  She is receptive to   02/12/20 [facility] Affiliation None   Stress Factors   Patient Stress Factors Health changes   Coping Responses   Patient Coping Accepting; Anxiety;Open/discussion   Plan of Care   Assessment Completed by: Unit visit

## 2020-02-12 NOTE — ASSESSMENT & PLAN NOTE
AST 49, ALT 29, Alk phos 165, total bilirubin 0 40     Pt was recently started on Lamisil, is almost finished with first month of therapy       -continue to trend LFT's

## 2020-02-12 NOTE — PROGRESS NOTES
Progress Note - [facility] 1970, 52 y o  female MRN: [id number]    Unit/Bed#: -01 Encounter: [id number]    Primary Care Provider: Delilah Welch MD   Date and time admitted to hospital: 2/11/2020  7:58 PM        Leukocytosis  Assessment & Plan  WBC count 12 88 in ED  Pt remains afebrile  No obvious signs of infection  · Continue to monitor WBC and fever curve will repeat at 1500 today    Sclerosing mesenteritis [facility])  Assessment & Plan  CT renal stone study revealed mild stranding within central mesentery about small mesenteric lymph nodes which could be seen in sclerosing mesenteritis  · Initiate Prednisone 40mg PO daily and Tamoxifen 10mg BID  · Inpatient consult to GI will discuss with them whether to continue this plan of action  · Inpatient consult to Acute care Surgery     * Acute flank pain  Assessment & Plan  · Pain started a m /February 11, 2020 while pt was in shower  Intensity fluctuated throughout day  10/10 at its worst   Presented to the ER last evening and CT still can without contrast done-now being seen by general surgery and GI-just returned from having a contrast study CT scan and await those results-still having pain this a m  But less severe  ·  UA negative for infection  · CT renal stone study revealed punctate nonobstructive 1 2 mm calculus in the upper pole of right kidney, mild stranding within central mesentery about small mesenteric lymph nodes seen in sclerosing mesenteritis  · Patient received Toradol 30 mg IV x1, fentanyl 100 mcg x1, and Dilaudid 0 5 mg x 1 in the ED  · Continue Dilaudid 0 5mg IV Q2H prn severe pain         Hypertension  Assessment & Plan  Will increase Ace dose to 20 mg daily as she did have some high readings overnight    Obesity (BMI 35 0-39 9 without comorbidity)  Assessment & Plan  Nutrition consult    Elevated LFTs  Assessment & Plan  Will repeat Chem 20 this afternoon    Fibromyalgia  Assessment & Plan  · Continue cymbalta, gabapentin, · Will hold on Celebrex as that may be causing some abdominal issues as well         VTE Prophylaxis: in place    Patient Centered Rounds: I rounded with patient's nurse    Current Length of Stay: 0 day(s)    Current Patient Status: Observation    Certification Statement: Pt requires additional inpatient hospital stay due to: see assessment and plan        Subjective:   No complaints of nausea or vomiting but still has right flank pain  Some intermittent cramping  Just returned from the CT with contrast awaiting those results  Patient reports that she had just resume smoking and will prefer the lower dose patch and will therefore decrease have from 14-7 mg  She also relates that she was recently started in the past month on Diflucan for fungal changes of her toenails  Will continue to hold on that for now until GI issues are evaluated  All other ROS are negative    Objective:     Vitals:   Temp (24hrs), Av 2 °F (36 8 °C), Min:97 9 °F (36 6 °C), Max:98 4 °F (36 9 °C)    Temp:  [97 9 °F (36 6 °C)-98 4 °F (36 9 °C)] 97 9 °F (36 6 °C)  HR:  [69-87] 69  Resp:  [15-18] 18  BP: (132-180)/(84-97) 132/84  SpO2:  [91 %-98 %] 93 %  Body mass index is 39 86 kg/m²  Input and Output Summary (last 24 hours): Intake/Output Summary (Last 24 hours) at 2020 1247  Last data filed at 2020 2150  Gross per 24 hour   Intake 1000 ml   Output    Net 1000 ml       Physical Exam:     Physical Exam   Constitutional: She appears well-developed and well-nourished  She appears distressed  HENT:   Head: Normocephalic  Nose: Nose normal    Mouth/Throat: Oropharynx is clear and moist    Eyes: Right eye exhibits no discharge  Left eye exhibits no discharge  Neck: No thyromegaly present  Cardiovascular: Normal rate and regular rhythm  Exam reveals no friction rub  No murmur heard  Pulmonary/Chest: Effort normal and breath sounds normal  No respiratory distress  She has no wheezes     Abdominal: Bowel sounds are normal  She exhibits no distension  There is tenderness  Right posterior  tenderness extending to the right flank   Musculoskeletal: She exhibits no edema  No calf tenderness   Lymphadenopathy:     She has no cervical adenopathy  Skin: No rash noted  No erythema  Psychiatric: She has a normal mood and affect  Her behavior is normal  Judgment and thought content normal    Nursing note and vitals reviewed  I personally reviewed labs and imaging reports for today  Last 24 Hours Medication List:     Current Facility-Administered Medications:  acetaminophen 650 mg Oral Q6H PRN AUSTIN Noriega   docusate sodium 100 mg Oral BID AUSTIN Bautista   DULoxetine 60 mg Oral Daily AUSTIN Montenegro   gabapentin 600 mg Oral BID AUSTIN James   HYDROmorphone 0 5 mg Intravenous Q2H PRN AUSTIN James   [START ON 2/13/2020] lisinopril 20 mg Oral Daily Julia Fly, DO   metoprolol succinate 50 mg Oral Daily AUSTIN Montenegro   montelukast 10 mg Oral Daily AUSTIN James   [START ON 2/13/2020] nicotine 7 mg Transdermal Daily Julia Fly,    ondansetron 4 mg Intravenous Q6H PRN AUSTIN Montenegro   pantoprazole 40 mg Oral Early Morning AUSTIN Bautista   predniSONE 40 mg Oral Daily AUSTIN Montenegro   senna 1 tablet Oral HS AUSTIN Bautista   tamoxifen 10 mg Oral BID AUSTIN James          Today, Patient Was Seen By: Amadou Tello DO    ** Please Note: Dictation voice to text software may have been used in the creation of this document   **

## 2020-02-12 NOTE — ED NOTES
Family at bedside  Pt denies trauma to back recently  Pt states she watches her 3year old grand daughter during the day       Randa Dear, DERICK  02/11/20 3990

## 2020-02-12 NOTE — CONSULTS
Consultation - General Surgery   Sena Buckley 52 y o  female MRN: [id number]  Unit/Bed#: -01 Encounter: [id number]    Assessment/Plan      Right flank pain-improving sioce admission  -no other associated symptoms- no N/V, fevrs, cills, urinary sx  -continue pain control as needed  -does appear to have some history of chronic intermittent diarrhea    Abnormal CT finding  -Mild stranding within the central mesentery about small mesenteric lymph nodes, nonspecific however may be seen in setting of sclerosing mesenteritis  -CT scan done without contrast, repeat CT with POIV contrast ordered  -patient already started on steroids and Tamoxifen  -feel that abnormality may be more likely related to transient finding or scar tissue related to previous gastric bypass  -patient does not appear to have chronic pain or other symptoms associated with sclerosing mesenteritis  -if abnormality still present on CT scan would recommend repeat CT scan in a few weeks to see if abnormality is still present  At that time would consider outpatient biopsy  -at this time continue treatment per medical team   Lis Jackson if tamoxifen needed at this time without definitive diagnosis  -will discuss with GI    H/o epigastric abdominal pain and NSAID use  -continue PPI  -GI follow-up    Elevated LFTs  -h/o Lamisil use  -continue to monitor  -GI follow-up        History of Present Illness     HPI:  Sena Buckley is a 52 y o  female who presented to the emergency department yesterday with severe right flank pain that started earlier in the day  Described the pain as sharp and stabbing and 10/10 intensity  Denies any previous history of similar pain  She did feel like it was hard to catch her breath due to the severe pain  She denies any associated nausea, vomiting, fevers, or chills  She denies any urinary symptoms, change in bowel habits, or bloody stools    She does admit to prior history of intermittent diarrhea but describes this as chronic  She occasionally gets intermittent epigastric pain but has recently been diagnosed with GERD  No recent weight changes  She does have a prior history of gastric bypass and cholecystectomy  Other medical history includes hypertension  Pain has improved this morning compared to yesterday  Consults    Review of Systems   Constitutional: Negative  Negative for chills, fever and unexpected weight change  HENT: Negative  Eyes: Negative  Respiratory: Negative  Negative for cough and shortness of breath  Cardiovascular: Negative  Negative for chest pain and palpitations  Gastrointestinal: Positive for abdominal pain and diarrhea  Negative for blood in stool, nausea and vomiting  Endocrine: Negative  Genitourinary: Negative  Negative for difficulty urinating and dysuria  Musculoskeletal: Negative  Skin: Negative  Negative for rash and wound  Allergic/Immunologic: Negative  Neurological: Negative  Hematological: Negative  Psychiatric/Behavioral: Negative  All other systems reviewed and are negative        Historical Information   Past Medical History:   Diagnosis Date    Allergic     Depression     GERD (gastroesophageal reflux disease)     Hypertension     PONV (postoperative nausea and vomiting)     Tachycardia      Past Surgical History:   Procedure Laterality Date    ANTERIOR CRUCIATE LIGAMENT REPAIR      CHOLECYSTECTOMY      GASTRIC BYPASS      HYSTERECTOMY      INGUINAL HERNIA REPAIR      JOINT REPLACEMENT      KNEE ARTHROSCOPY Right     ORIF FINGER / THUMB FRACTURE Right     thumb surgery    [place] LARYNGOSCOPY,DIRCT,OP SCOPE,BIOPSY N/A 7/12/2018    Procedure: LARYNGOSCOPY DIRECT;  Surgeon: Katherine Wren MD;  Location:  MAIN OR;  Service: ENT    PYELOPLASTY      REPLACEMENT TOTAL KNEE Right     ROTATOR CUFF REPAIR       Social History   Social History     Substance and Sexual Activity   Alcohol Use Yes    Frequency: 2-3 times a week    Drinks per session: 1 or 2    Binge frequency: Never    Comment: Occasional     Social History     Substance and Sexual Activity   Drug Use No     Social History     Tobacco Use   Smoking Status Former Smoker    Packs/day: 1 00    Types: Cigarettes    Last attempt to quit: 2020    Years since quittin 0   Smokeless Tobacco Never Used     Family History: non-contributory    Meds/Allergies   all current active meds have been reviewed  Allergies   Allergen Reactions    Sulfamethoxazole-Trimethoprim Hives and Rash    Ferumoxytol Other (See Comments)     Caused Hypertension and hard time breathing    Sulfa Antibiotics Rash       Objective   First Vitals:   Blood Pressure: (!) 180/97 (20)  Pulse: 75 (20)  Temperature: 98 1 °F (36 7 °C) (20)  Temp Source: Oral (20)  Respirations: 15 (20)  Height: 5' 3" (160 cm) (20)  Weight - Scale: 99 8 kg (220 lb) (20)  SpO2: 98 % (20)    Current Vitals:   Blood Pressure: 132/84 (20)  Pulse: 69 (20)  Temperature: 97 9 °F (36 6 °C) (20)  Temp Source: Oral (20)  Respirations: 18 (20)  Height: 5' 3" (160 cm) (20)  Weight - Scale: 102 kg (225 lb) (20)  SpO2: 93 % (20)      Intake/Output Summary (Last 24 hours) at 2020 1133  Last data filed at 2020 2150  Gross per 24 hour   Intake 1000 ml   Output    Net 1000 ml       Invasive Devices     Peripheral Intravenous Line            Peripheral IV 20 Right;Dorsal (posterior) Hand less than 1 day                Physical Exam   Constitutional: She is oriented to person, place, and time  She appears well-developed and well-nourished  No distress  HENT:   Head: Normocephalic and atraumatic  Mouth/Throat: Oropharynx is clear and moist  No oropharyngeal exudate  Eyes: EOM are normal  Right eye exhibits no discharge  Left eye exhibits no discharge   No scleral icterus  Neck: Normal range of motion  Neck supple  No JVD present  No tracheal deviation present  Cardiovascular: Normal rate, regular rhythm and normal heart sounds  No murmur heard  Pulmonary/Chest: Effort normal and breath sounds normal  No respiratory distress  She has no wheezes  Abdominal: Soft  Bowel sounds are normal  She exhibits no distension  Mild diffuse tenderness, active bowel sounds   Musculoskeletal: Normal range of motion  She exhibits no edema or deformity  Neurological: She is alert and oriented to person, place, and time  No focal deficits   Skin: Skin is warm and dry  No rash noted  She is not diaphoretic  Psychiatric: She has a normal mood and affect  Her behavior is normal        Lab Results:   I have personally reviewed pertinent lab results  , CBC:   Lab Results   Component Value Date    WBC 9 64 02/12/2020    HGB 13 9 02/12/2020    HCT 43 8 02/12/2020    MCV 96 02/12/2020     02/12/2020    MCH 30 5 02/12/2020    MCHC 31 7 02/12/2020    RDW 13 2 02/12/2020    MPV 11 7 02/12/2020    NRBC 0 02/12/2020   , CMP:   Lab Results   Component Value Date    SODIUM 141 02/12/2020    K 4 6 02/12/2020     02/12/2020    CO2 27 02/12/2020    BUN 11 02/12/2020    CREATININE 0 74 02/12/2020    CALCIUM 8 9 02/12/2020    AST 49 (H) 02/11/2020    ALT 29 02/11/2020    ALKPHOS 165 (H) 02/11/2020    EGFR 95 02/12/2020   , Coagulation: No results found for: PT, INR, APTT, Urinalysis:   Lab Results   Component Value Date    COLORU Straw 02/11/2020    CLARITYU Clear 02/11/2020    SPECGRAV 1 010 02/11/2020    PHUR 6 5 02/11/2020    LEUKOCYTESUR Negative 02/11/2020    NITRITE Negative 02/11/2020    GLUCOSEU Negative 02/11/2020    KETONESU Negative 02/11/2020    BILIRUBINUR Negative 02/11/2020    BLOODU Negative 02/11/2020   , Amylase: No results found for: AMYLASE, Lipase: No results found for: LIPASE  Imaging: I have personally reviewed pertinent reports  IMPRESSION:     1  Punctate nonobstructive 1 to 2 mm calculus in the upper pole of the right kidney  No hydronephrosis  2   Mild stranding within the central mesentery about small mesenteric lymph nodes, nonspecific however may be seen in setting of sclerosing mesenteritis  CT A/P:    EKG, Pathology, and Other Studies: I have personally reviewed pertinent reports        Arden Mosher PA-C

## 2020-02-12 NOTE — H&P
H&P- Latasha Parksl 1970, 52 y o  female MRN: [id number]    Unit/Bed#: -01 Encounter: [id number]    Primary Care Provider: Donnie Little MD   Date and time admitted to hospital: 2/11/2020  7:58 PM        * Acute flank pain  Assessment & Plan  Pain started this morning while pt was in shower  Intensity fluctuated throughout day  10/10 at its worst  Thought it was a muscle spasm  At its worst it feels so "tight" she wasn't able to take a deep breath  · UA negative for infection  · CT renal stone study revealed punctate nonobstructive 1 2 mm calculus in the upper pole of right kidney, mild stranding within central mesentery about small mesenteric lymph nodes seen in sclerosing mesenteritis  · Patient received Toradol 30 mg IV x1, fentanyl 100 mcg x1, and Dilaudid 0 5 mg x 1 in the ED  · Continue Dilaudid 0 5mg IV Q2H prn severe pain  Leukocytosis  Assessment & Plan  WBC count 12 88 in ED  Pt remains afebrile  No obvious signs of infection  · Continue to monitor WBC and fever curve    Nephrolithiasis  Assessment & Plan  Punctate 1 to 2 mm non obstructing calculus in the upper pole of the R kidney  No hydronephrosis  · No intervention needed at this time  Sclerosing mesenteritis ([address] )  Assessment & Plan  CT renal stone study revealed mild stranding within central mesentery about small mesenteric lymph nodes which could be seen in sclerosing mesenteritis  · Initiate Prednisone 40mg PO daily and Tamoxifen 10mg BID  · Inpatient consult to GI  · Inpatient consult to Acute care Surgery     Fibromyalgia  Assessment & Plan  · Continue cymbalta, gabapentin, and celebrex         VTE Prophylaxis: Enoxaparin (Lovenox)  / sequential compression device   Code Status: Full code  POLST: There is no POLST form on file for this patient (pre-hospital)  Discussion with family: No family present during admission    Anticipated Length of Stay:  Patient will be admitted on an Observation basis with an anticipated length of stay of  < 2 midnights  Justification for Hospital Stay: IV pain control    Total Time for Visit, including Counseling / Coordination of Care: 30 minutes  Greater than 50% of this total time spent on direct patient counseling and coordination of care  Chief Complaint:   R flank pain    History of Present Illness:    Janeth Gauthier is a 52 y o  female with multiple abdominal surgeries, HTN, and fibromyalgia who presents with c/o R flank pain that began this am while she was in the shower  Pain intensity fluctuated throughout the day but was 10/10 at it's worst  Pt did not take anything for the pain  Taking a deep breath made the pain worse  She states that pain felt like spasms that at one point got so tight she couldn't take a deep breath or talk  Review of Systems:    Review of Systems   Constitutional: Negative for appetite change, chills and fever  Respiratory: Negative for apnea, cough and shortness of breath  Cardiovascular: Negative for chest pain, palpitations and leg swelling  Gastrointestinal: Negative for abdominal distention, abdominal pain, constipation, diarrhea, nausea and vomiting  Genitourinary: Positive for flank pain (R flank)  Negative for difficulty urinating, dysuria and frequency  Neurological: Negative for seizures, facial asymmetry, weakness, light-headedness, numbness and headaches  Psychiatric/Behavioral: Negative for agitation and confusion  The patient is not nervous/anxious  All other systems reviewed and are negative        Past Medical and Surgical History:     Past Medical History:   Diagnosis Date    Allergic     Depression     GERD (gastroesophageal reflux disease)     Hypertension     PONV (postoperative nausea and vomiting)     Tachycardia        Past Surgical History:   Procedure Laterality Date    ANTERIOR CRUCIATE LIGAMENT REPAIR      CHOLECYSTECTOMY      GASTRIC BYPASS      HYSTERECTOMY      INGUINAL HERNIA REPAIR  JOINT REPLACEMENT      KNEE ARTHROSCOPY Right     ORIF FINGER / THUMB FRACTURE Right     thumb surgery    OR LARYNGOSCOPY,DIRCT,OP SCOPE,BIOPSY N/A 7/12/2018    Procedure: LARYNGOSCOPY DIRECT;  Surgeon: Mavis Solitario MD;  Location: [facility];  Service: ENT    PYELOPLASTY      REPLACEMENT TOTAL KNEE Right     ROTATOR CUFF REPAIR         Meds/Allergies:    Prior to Admission medications    Medication Sig Start Date End Date Taking? Authorizing Provider   celecoxib (CeleBREX) 200 mg capsule Take 200 mg by mouth 2 (two) times a day 7/22/17  Yes Historical Provider, MD   terbinafine (LamISIL) 250 mg tablet Take 250 mg by mouth daily 1/14/20  Yes Historical Provider, MD   DULoxetine (CYMBALTA) 60 mg delayed release capsule Take 1 capsule (60 mg total) by mouth daily 1/15/20   Chelo Hoffman MD   gabapentin (NEURONTIN) 600 MG tablet 600 mg 2 (two) times a day 1 tab --three times daily     Historical Provider, MD   lisinopril (ZESTRIL) 10 mg tablet TAKE 1 TABLET BY MOUTH EVERY DAY 1/10/20   Hussein Munguia PA-C   metoprolol succinate (TOPROL-XL) 50 mg 24 hr tablet Take 1 tablet (50 mg total) by mouth daily 12/24/19   Hussein Munguia PA-C   montelukast (SINGULAIR) 10 mg tablet Take 1 tablet (10 mg total) by mouth daily 12/24/19   Hussein Munguia PA-C     I have reviewed home medications with patient personally  Allergies:    Allergies   Allergen Reactions    Sulfamethoxazole-Trimethoprim Hives and Rash    Ferumoxytol Other (See Comments)     Caused Hypertension and hard time breathing    Sulfa Antibiotics Rash       Social History:     Marital Status:    Occupation:   Patient Pre-hospital Living Situation: Lives with mom  Patient Pre-hospital Level of Mobility: Independent  Patient Pre-hospital Diet Restrictions: None  Substance Use History:   Social History     Substance and Sexual Activity   Alcohol Use Yes    Frequency: 2-3 times a week    Drinks per session: 1 or 2  Binge frequency: Never    Comment: Occasional     Social History     Tobacco Use   Smoking Status Former Smoker    Packs/day: 1 00    Types: Cigarettes    Last attempt to quit: 2020    Years since quittin 0   Smokeless Tobacco Never Used     Social History     Substance and Sexual Activity   Drug Use No       Family History:    non-contributory    Physical Exam:     Vitals:   Blood Pressure: 134/87 (20)  Pulse: 87 (20)  Temperature: 98 2 °F (36 8 °C) (20)  Temp Source: Oral (20)  Respirations: 17 (20)  Height: 5' 3" (160 cm) (20)  Weight - Scale: 102 kg (225 lb) (20)  SpO2: 91 % (20)    Physical Exam   Constitutional: She is oriented to person, place, and time  She appears well-developed and well-nourished  She is cooperative  No distress  obese   HENT:   Head: Normocephalic and atraumatic  Eyes: Pupils are equal, round, and reactive to light  EOM are normal    Neck: Normal range of motion  Neck supple  Cardiovascular: Normal rate, regular rhythm, normal heart sounds and intact distal pulses  Exam reveals no gallop and no friction rub  No murmur heard  Pulmonary/Chest: Effort normal and breath sounds normal  No respiratory distress  She has no wheezes  She has no rales  Abdominal: Soft  Bowel sounds are normal  She exhibits no distension  There is no tenderness  Musculoskeletal: Normal range of motion  She exhibits no edema  Neurological: She is alert and oriented to person, place, and time  Skin: Skin is warm and dry  Psychiatric: She has a normal mood and affect  Judgment normal    Nursing note and vitals reviewed  Additional Data:     Lab Results: I have personally reviewed pertinent reports        Results from last 7 days   Lab Units 20   WBC Thousand/uL 12 88*   HEMOGLOBIN g/dL 14 8   HEMATOCRIT % 44 8   PLATELETS Thousands/uL 382   NEUTROS PCT % 71   LYMPHS PCT % 19   MONOS PCT % 6   EOS PCT % 2     Results from last 7 days   Lab Units 02/11/20  2030   SODIUM mmol/L 141   POTASSIUM mmol/L 5 7*   CHLORIDE mmol/L 106   CO2 mmol/L 27   BUN mg/dL 13   CREATININE mg/dL 0 63   ANION GAP mmol/L 8   CALCIUM mg/dL 9 0   ALBUMIN g/dL 3 6   TOTAL BILIRUBIN mg/dL 0 40   ALK PHOS U/L 165*   ALT U/L 29   AST U/L 49*   GLUCOSE RANDOM mg/dL 93                       Imaging: I have personally reviewed pertinent reports  CT renal stone study abdomen pelvis wo contrast   Final Result by Elise Louie MD (02/11 2130)      1  Punctate nonobstructive 1 to 2 mm calculus in the upper pole of the right kidney  No hydronephrosis  2   Mild stranding within the central mesentery about small mesenteric lymph nodes, nonspecific however may be seen in setting of sclerosing mesenteritis  Workstation performed: [id number]             EKG, Pathology, and Other Studies Reviewed on Admission:   · EKG: SR    Allscripts / Epic Records Reviewed: Yes     ** Please Note: This note has been constructed using a voice recognition system   **

## 2020-02-12 NOTE — H&P (VIEW-ONLY)
Consultation - GI   [facility] 52 y o  female MRN: [id number]  Unit/Bed#: -01 Encounter: [id number]    Inpatient consult to gastroenterology  Consult performed by: AUSTIN Day  Consult ordered by: AUSTIN Alston        1  Abdominal pain, possible sclerosing mesenteritis   Continues with right mid back pain and abdominal pain this morning  CT A/P without contrast showed:     Mild stranding within the central mesentery about small mesenteric lymph nodes, nonspecific however may be seen in setting of sclerosing mesenteritis  It is difficult to determine if this is true sclerosing mesenteritis as CT was performed without contrast  Differential diagnoses include intermittent bowel obstruction given history of multiple abdominal surgeries  -will repeat CT A/P with PO and IV contrast   -will check ESR and CRP   -Pt was started on Tamoxifen 10 mg BID and Prednisone 40 mg daily   -Continue pain control PRN   -Appreciate Surgery recommendations    2  Epigastric/upper abdominal pain   3  Dysphagia   Continues with epigastric and upper abdominal pain, had been taking Omeprazole as an outpatient but has not taken since mid January  She does use Celebrex b i d  Also endorses solid dysphagia, difficulty swallowing pills and occasional early satiety  Last EGD was over 5 years ago  Differential diagnoses include: peptic ulcer disease, gastritis, esophagitis  -EGD with possible dilation tomorrow 2/13   -will add Protonix 40 mg daily     4  Elevated LFT's   AST 49, ALT 29, Alk phos 165   Pt recently started on Lamisil, just finished one month of treatment  May be related to medication  -will continue trend LFT's     Case discussed with Dr Neha Emerson  Chief Complaint   Patient presents with    Back Pain     Right low to mid back pain started 2 hours ago; denies injury; describes as spasms       Physician Requesting Consult: Dwayne Ball DO    HPI Monetjulianna Stewart is a 52y o  year old female with PMH of Murali-en-Y gastric bypass (2008), HTN, fibromyalgia, hiatal hernia repair, cholecystectomy who presents with right flank pain and periumbilical abdominal pain that started last evening  She states that pain was intermittent and severe, rated pain 10/10 at its worst and described as sharp, stabbing  She also states that pain "felt like a spasm and was worse than childbirth " Associated with SOB  Also associated with mild periumbilical pain, rates pain 3/10  She reports that over the past few years she has had a change in bowel habits and will have occasional episodes of diarrhea, sometimes will occur after eating  This occurs at least a few times per week  Last BM was 3 days ago  Also recently has noticed increased bloating  She denies any fever/chills, nausea/vomiting, rectal bleeding, melena, urinary symptoms  She previously followed with a GI physician in [place] and had an EGD and colonoscopy about 5 years ago  She does have difficulty swallowing pills  She had her tonsils removed in September 2019 at [facility], her symptoms did not improve after procedure  She also has had solid food dysphagia that has been ongoing for the past year  Denies any issues with liquids  She does endorse occasional early satiety this is been present since her gastric bypass operation but she states that it has worsened over the past few months  Her appetite and weight are stable        Historical Information   Past Medical History:   Diagnosis Date    Allergic     Depression     GERD (gastroesophageal reflux disease)     Hypertension     PONV (postoperative nausea and vomiting)     Tachycardia      Past Surgical History:   Procedure Laterality Date    ANTERIOR CRUCIATE LIGAMENT REPAIR      CHOLECYSTECTOMY      GASTRIC BYPASS      HYSTERECTOMY      INGUINAL HERNIA REPAIR      JOINT REPLACEMENT      KNEE ARTHROSCOPY Right     ORIF FINGER / THUMB FRACTURE Right     thumb surgery    IA LARYNGOSCOPY,DIRCT,OP SCOPE,BIOPSY N/A 2018    Procedure: LARYNGOSCOPY DIRECT;  Surgeon: Freddy Woodruff MD;  [facility];  Service: ENT    PYELOPLASTY      REPLACEMENT TOTAL KNEE Right     ROTATOR CUFF REPAIR       Social History   Social History     Substance and Sexual Activity   Alcohol Use Yes    Frequency: 2-3 times a week    Drinks per session: 1 or 2    Binge frequency: Never    Comment: Occasional     Social History     Substance and Sexual Activity   Drug Use No     Social History     Tobacco Use   Smoking Status Former Smoker    Packs/day: 1 00    Types: Cigarettes    Last attempt to quit: 2020    Years since quittin 0   Smokeless Tobacco Never Used     Family History   Adopted: Yes   Problem Relation Age of Onset    Diabetes Mother     Mental illness Mother         disorder    Hypertension Mother         benign    Substance Abuse Mother     Mental illness Father         disorder    Substance Abuse Father     Mental illness Family         disorder    Substance Abuse Family        Meds/Allergies   Current Facility-Administered Medications   Medication Dose Route Frequency    acetaminophen (TYLENOL) tablet 650 mg  650 mg Oral Q6H PRN    celecoxib (CeleBREX) capsule 200 mg  200 mg Oral BID    docusate sodium (COLACE) capsule 100 mg  100 mg Oral BID    DULoxetine (CYMBALTA) delayed release capsule 60 mg  60 mg Oral Daily    gabapentin (NEURONTIN) capsule 600 mg  600 mg Oral BID    HYDROmorphone (DILAUDID) injection 0 5 mg  0 5 mg Intravenous Q2H PRN    lisinopril (ZESTRIL) tablet 10 mg  10 mg Oral Daily    metoprolol succinate (TOPROL-XL) 24 hr tablet 50 mg  50 mg Oral Daily    montelukast (SINGULAIR) tablet 10 mg  10 mg Oral Daily    nicotine (NICODERM CQ) 14 mg/24hr TD 24 hr patch 14 mg  14 mg Transdermal Daily    ondansetron (ZOFRAN) injection 4 mg  4 mg Intravenous Q6H PRN    predniSONE tablet 40 mg  40 mg Oral Daily    senna (SENOKOT) tablet 8 6 mg  1 tablet Oral HS    tamoxifen (NOLVADEX) tablet 10 mg  10 mg Oral BID     Medications Prior to Admission   Medication    celecoxib (CeleBREX) 200 mg capsule    terbinafine (LamISIL) 250 mg tablet    DULoxetine (CYMBALTA) 60 mg delayed release capsule    gabapentin (NEURONTIN) 600 MG tablet    lisinopril (ZESTRIL) 10 mg tablet    metoprolol succinate (TOPROL-XL) 50 mg 24 hr tablet    montelukast (SINGULAIR) 10 mg tablet       Allergies   Allergen Reactions    Sulfamethoxazole-Trimethoprim Hives and Rash    Ferumoxytol Other (See Comments)     Caused Hypertension and hard time breathing    Sulfa Antibiotics Rash       PHYSICALEXAM  Blood pressure 132/84, pulse 69, temperature 97 9 °F (36 6 °C), resp  rate 18, height 5' 3" (1 6 m), weight 102 kg (225 lb), SpO2 93 %  Body mass index is 39 86 kg/m²  General Appearance: NAD, cooperative, alert  Eyes: Anicteric, PERRLA, EOMI  ENT:  Normocephalic, atraumatic, normal mucosa  Neck:  Supple, symmetrical, trachea midline  Resp:  Clear to auscultation bilaterally; no rales, rhonchi or wheezing; respirations unlabored   CV:  S1 S2, Regular rate and rhythm; no murmur, rub, or gallop  GI:  Soft, +diffuse abdominal pain, worse in epigastric area and RUQ, LUQ, no guarding, no rebound, normal bowel sounds; no masses, no organomegaly   Back: +right mid back pain   Rectal: Deferred  Musculoskeletal: No cyanosis, clubbing or edema  Normal ROM    Skin:  No jaundice, rashes, or lesions   Heme/Lymph: No palpable cervical lymphadenopathy  Psych: Normal affect, good eye contact  Neuro: No gross deficits, AAOx3    Lab Results   Component Value Date    CALCIUM 8 9 02/12/2020    K 4 6 02/12/2020    CO2 27 02/12/2020     02/12/2020    BUN 11 02/12/2020    CREATININE 0 74 02/12/2020     Lab Results   Component Value Date    WBC 9 64 02/12/2020    HGB 13 9 02/12/2020    HCT 43 8 02/12/2020    MCV 96 02/12/2020     02/12/2020     Lab Results   Component Value Date    ALT 29 02/11/2020    AST 49 (H) 02/11/2020    ALKPHOS 165 (H) 02/11/2020     No results found for: AMYLASE  No results found for: LIPASE  No results found for: IRON, TIBC, FERRITIN  No results found for: INR    Imaging Studies: I have personally reviewed pertinent reports  EKG, Pathology, and Other Studies: I have personally reviewed pertinent reports  REVIEW OF SYSTEMS:    CONSTITUTIONAL: Denies any fever, chills, rigors, and weight loss  HEENT: No earache or tinnitus  Denies hearing loss or visual disturbances  CARDIOVASCULAR: No chest pain or palpitations  RESPIRATORY: Denies any cough, hemoptysis, shortness of breath or dyspnea on exertion  GASTROINTESTINAL: As noted in the History of Present Illness  GENITOURINARY: No problems with urination  Denies any hematuria or  dysuria  NEUROLOGIC: No dizziness or vertigo, denies headaches  MUSCULOSKELETAL: Denies any muscle or joint pain  SKIN: Denies skin rashes or itching  ENDOCRINE: Denies excessive thirst  Denies intolerance to heat or cold  PSYCHOSOCIAL: Denies depression or anxiety  Denies any recent memory loss

## 2020-02-12 NOTE — ASSESSMENT & PLAN NOTE
Pain started this morning while pt was in shower  Intensity fluctuated throughout day  10/10 at its worst  Thought it was a muscle spasm  At its worst it feels so "tight" she wasn't able to take a deep breath  · UA negative for infection  · CT renal stone study revealed punctate nonobstructive 1 2 mm calculus in the upper pole of right kidney, mild stranding within central mesentery about small mesenteric lymph nodes seen in sclerosing mesenteritis  · Patient received Toradol 30 mg IV x1, fentanyl 100 mcg x1, and Dilaudid 0 5 mg x 1 in the ED  · Continue Dilaudid 0 5mg IV Q2H prn severe pain

## 2020-02-13 ENCOUNTER — ANESTHESIA (OUTPATIENT)
Dept: GASTROENTEROLOGY | Facility: HOSPITAL | Age: 50
End: 2020-02-13
Payer: COMMERCIAL

## 2020-02-13 ENCOUNTER — APPOINTMENT (OUTPATIENT)
Dept: GASTROENTEROLOGY | Facility: HOSPITAL | Age: 50
End: 2020-02-13
Payer: COMMERCIAL

## 2020-02-13 ENCOUNTER — ANESTHESIA EVENT (OUTPATIENT)
Dept: GASTROENTEROLOGY | Facility: HOSPITAL | Age: 50
End: 2020-02-13
Payer: COMMERCIAL

## 2020-02-13 VITALS
HEIGHT: 63 IN | BODY MASS INDEX: 39.87 KG/M2 | HEART RATE: 109 BPM | TEMPERATURE: 98.7 F | WEIGHT: 225 LBS | DIASTOLIC BLOOD PRESSURE: 96 MMHG | OXYGEN SATURATION: 96 % | SYSTOLIC BLOOD PRESSURE: 140 MMHG | RESPIRATION RATE: 18 BRPM

## 2020-02-13 PROBLEM — K65.4 SCLEROSING MESENTERITIS (HCC): Status: RESOLVED | Noted: 2020-02-11 | Resolved: 2020-02-13

## 2020-02-13 PROBLEM — N20.0 NEPHROLITHIASIS: Status: RESOLVED | Noted: 2020-02-11 | Resolved: 2020-02-13

## 2020-02-13 PROCEDURE — 43235 EGD DIAGNOSTIC BRUSH WASH: CPT | Performed by: INTERNAL MEDICINE

## 2020-02-13 PROCEDURE — 93005 ELECTROCARDIOGRAM TRACING: CPT

## 2020-02-13 PROCEDURE — 99239 HOSP IP/OBS DSCHRG MGMT >30: CPT | Performed by: INTERNAL MEDICINE

## 2020-02-13 RX ORDER — ACETAMINOPHEN 325 MG/1
650 TABLET ORAL EVERY 6 HOURS PRN
Qty: 30 TABLET | Refills: 0 | Status: SHIPPED | OUTPATIENT
Start: 2020-02-13

## 2020-02-13 RX ORDER — SODIUM CHLORIDE 9 MG/ML
INJECTION, SOLUTION INTRAVENOUS CONTINUOUS PRN
Status: DISCONTINUED | OUTPATIENT
Start: 2020-02-13 | End: 2020-02-13 | Stop reason: SURG

## 2020-02-13 RX ORDER — GABAPENTIN 300 MG/1
600 CAPSULE ORAL 2 TIMES DAILY
Refills: 0
Start: 2020-02-13 | End: 2021-02-23 | Stop reason: SDUPTHER

## 2020-02-13 RX ORDER — LISINOPRIL 20 MG/1
20 TABLET ORAL DAILY
Qty: 30 TABLET | Refills: 0 | Status: SHIPPED | OUTPATIENT
Start: 2020-02-14 | End: 2020-03-06

## 2020-02-13 RX ORDER — PROPOFOL 10 MG/ML
INJECTION, EMULSION INTRAVENOUS AS NEEDED
Status: DISCONTINUED | OUTPATIENT
Start: 2020-02-13 | End: 2020-02-13 | Stop reason: SURG

## 2020-02-13 RX ORDER — PANTOPRAZOLE SODIUM 40 MG/1
40 TABLET, DELAYED RELEASE ORAL
Qty: 30 TABLET | Refills: 1 | Status: SHIPPED | OUTPATIENT
Start: 2020-02-14 | End: 2020-02-26

## 2020-02-13 RX ADMIN — SODIUM CHLORIDE: 0.9 INJECTION, SOLUTION INTRAVENOUS at 12:40

## 2020-02-13 RX ADMIN — PROPOFOL 20 MG: 10 INJECTION, EMULSION INTRAVENOUS at 12:49

## 2020-02-13 RX ADMIN — MONTELUKAST 10 MG: 10 TABLET, FILM COATED ORAL at 09:05

## 2020-02-13 RX ADMIN — HYDROMORPHONE HYDROCHLORIDE 0.5 MG: 1 INJECTION, SOLUTION INTRAMUSCULAR; INTRAVENOUS; SUBCUTANEOUS at 09:01

## 2020-02-13 RX ADMIN — PANTOPRAZOLE SODIUM 40 MG: 40 TABLET, DELAYED RELEASE ORAL at 05:54

## 2020-02-13 RX ADMIN — TAMOXIFEN CITRATE 10 MG: 10 TABLET, FILM COATED ORAL at 09:12

## 2020-02-13 RX ADMIN — GABAPENTIN 600 MG: 300 CAPSULE ORAL at 09:05

## 2020-02-13 RX ADMIN — NICOTINE 7 MG: 7 PATCH TRANSDERMAL at 09:05

## 2020-02-13 RX ADMIN — HYDROMORPHONE HYDROCHLORIDE 0.5 MG: 1 INJECTION, SOLUTION INTRAMUSCULAR; INTRAVENOUS; SUBCUTANEOUS at 17:19

## 2020-02-13 RX ADMIN — PROPOFOL 30 MG: 10 INJECTION, EMULSION INTRAVENOUS at 12:48

## 2020-02-13 RX ADMIN — PREDNISONE 40 MG: 20 TABLET ORAL at 09:05

## 2020-02-13 RX ADMIN — METOPROLOL TARTRATE 25 MG: 25 TABLET ORAL at 17:21

## 2020-02-13 RX ADMIN — DULOXETINE 60 MG: 30 CAPSULE, DELAYED RELEASE ORAL at 09:05

## 2020-02-13 RX ADMIN — GABAPENTIN 600 MG: 300 CAPSULE ORAL at 17:21

## 2020-02-13 RX ADMIN — LISINOPRIL 20 MG: 20 TABLET ORAL at 09:05

## 2020-02-13 RX ADMIN — PROPOFOL 50 MG: 10 INJECTION, EMULSION INTRAVENOUS at 12:45

## 2020-02-13 RX ADMIN — PROPOFOL 20 MG: 10 INJECTION, EMULSION INTRAVENOUS at 12:50

## 2020-02-13 RX ADMIN — HYDROMORPHONE HYDROCHLORIDE 0.5 MG: 1 INJECTION, SOLUTION INTRAMUSCULAR; INTRAVENOUS; SUBCUTANEOUS at 13:58

## 2020-02-13 RX ADMIN — ACETAMINOPHEN 650 MG: 325 TABLET, FILM COATED ORAL at 19:05

## 2020-02-13 RX ADMIN — PROPOFOL 50 MG: 10 INJECTION, EMULSION INTRAVENOUS at 12:46

## 2020-02-13 NOTE — OP NOTE
[address] [facility]  [address] [place] [address]  No information on file         DATE OF SERVICE:  2/13/20     PHYSICIAN(S):  MARIA ESTHER FLOWERS  - Attending Physician     INDICATION:  Abdominal Pain     POST-OP DIAGNOSIS:  See the impression below      PREPROCEDURE:  Informed consent was obtained for the procedure, including sedation  Risks of perforation, hemorrhage, adverse drug reaction and aspiration were discussed  The patient was placed in the left lateral decubitus position      Patient was explained about the risks and benefits of the procedure  Risks including but not limited to bleeding, infection, and perforation were explained in detail  Also explained about less than 100% sensitivity with the exam and other alternatives      DETAILS OF PROCEDURE:  The patient underwent deep sedation, which was administered by an anesthesia professional  The patient's blood pressure, heart rate, level of consciousness, respirations and oxygen were monitored throughout the procedure  The scope was introducedthrough the mouth and advanced to the second part of the duodenum  Retroflexion was performed in the fundus  Prior to the procedure, the patient's H  Pylori status was unknown  The patient experienced no blood loss  The procedure was not difficult  Thepatient tolerated the procedure well  There were no apparent complications       ANESTHESIA INFORMATION:  ASA: III  Anesthesia Type: IV Sedation with Anesthesia     FINDINGS:  · Signs of previous gastric resection and bypass procedure in the stomach  · The jejunum appeared normal  · The stomach appeared normal  Remnant of stomach pouch appears normal  · The esophagus appeared normal        SPECIMENS:  * No specimens in log *     IMPRESSION:  Normal post op anatomy and mucosa        RECOMMENDATION:  Resume home meds  Resume previous diet  Follow up with your primary care provider as previously scheduled    Follow up with GI as previously scheduled    Awaiting CT results to be read                  Signed by Klever Escamilla MD on 2/13/2020 12:58 PM

## 2020-02-13 NOTE — PROGRESS NOTES
02/13/20 1100   Plan of Care   Care Plan Initiated Patient/family refused  involvement   Assessment Completed by: Unit visit; Other (Comment)  (Follow up visit  )

## 2020-02-13 NOTE — ANESTHESIA PREPROCEDURE EVALUATION
Review of Systems/Medical History  Patient summary reviewed  Chart reviewed  History of anesthetic complications PONV    Cardiovascular  Exercise tolerance (METS): >4,  Hypertension controlled, Dysrhythmias , history of PSVT,    Pulmonary  Smoker cigarette smoker  ,        GI/Hepatic  Dysphagia, Dysphagia type: solids  GERD poorly controlled,        Kidney stones,        Endo/Other    Obesity    GYN    Hysterectomy,        Hematology  Negative hematology ROS      Musculoskeletal  Back pain , lumbar pain, Sciatica, Osteoarthritis,   Arthritis     Neurology   Psychology   Depression ,              Physical Exam    Airway    Mallampati score: II  TM Distance: >3 FB  Neck ROM: full     Dental   No notable dental hx     Cardiovascular  Rhythm: regular, Rate: normal, Cardiovascular exam normal    Pulmonary  Decreased breath sounds,     Other Findings        Anesthesia Plan  ASA Score- 3     Anesthesia Type- IV sedation with anesthesia with ASA Monitors  Additional Monitors:   Airway Plan:         Plan Factors-    Induction- intravenous  Postoperative Plan-     Informed Consent- Anesthetic plan and risks discussed with patient

## 2020-02-13 NOTE — PROGRESS NOTES
Pastoral Care Progress Note    2020  Patient: Brie Booth : 1970  Admission Date & Time: 2020  MRN: [id number] [facility]: [id number]                     Chaplaincy Interventions Utilized:   Empowerment: Provided chaplaincy education                    Chaplaincy Outcomes Achieved:  Declined  support                   20 1100   Plan of Care   Care Plan Initiated Patient/family refused  involvement   Assessment Completed by: Unit visit; Other (Comment)  (Follow up visit  )

## 2020-02-13 NOTE — INTERVAL H&P NOTE
H&P reviewed  After examining the patient I find no changes in the patients condition since the H&P had been written  CT A/P w IV contrast pending  LFTs bumped and coming back down  Will proceed with EGD today      Vitals:    02/13/20 0908   BP: 135/95   Pulse: 79   Resp:    Temp:    SpO2: 94%

## 2020-02-13 NOTE — PLAN OF CARE
Problem: PAIN - ADULT  Goal: Verbalizes/displays adequate comfort level or baseline comfort level  Description  Interventions:  - Encourage patient to monitor pain and request assistance  - Assess pain using appropriate pain scale  - Administer analgesics based on type and severity of pain and evaluate response  - Implement non-pharmacological measures as appropriate and evaluate response  - Consider cultural and social influences on pain and pain management  - Notify physician/advanced practitioner if interventions unsuccessful or patient reports new pain  Outcome: Progressing     Problem: INFECTION - ADULT  Goal: Absence or prevention of progression during hospitalization  Description  INTERVENTIONS:  - Assess and monitor for signs and symptoms of infection  - Monitor lab/diagnostic results  - Monitor all insertion sites, i e  indwelling lines, tubes, and drains  - Monitor endotracheal if appropriate and nasal secretions for changes in amount and color  - [place] appropriate cooling/warming therapies per order  - Administer medications as ordered  - Instruct and encourage patient and family to use good hand hygiene technique  - Identify and instruct in appropriate isolation precautions for identified infection/condition  Outcome: Progressing  Goal: Absence of fever/infection during neutropenic period  Description  INTERVENTIONS:  - Monitor WBC    Outcome: Progressing     Problem: SAFETY ADULT  Goal: Patient will remain free of falls  Description  INTERVENTIONS:  - Assess patient frequently for physical needs  -  Identify cognitive and physical deficits and behaviors that affect risk of falls    -  [place] fall precautions as indicated by assessment   - Educate patient/family on patient safety including physical limitations  - Instruct patient to call for assistance with activity based on assessment  - Modify environment to reduce risk of injury  - Consider OT/PT consult to assist with strengthening/mobility  Outcome: Progressing  Goal: Maintain or return to baseline ADL function  Description  INTERVENTIONS:  -  Assess patient's ability to carry out ADLs; assess patient's baseline for ADL function and identify physical deficits which impact ability to perform ADLs (bathing, care of mouth/teeth, toileting, grooming, dressing, etc )  - Assess/evaluate cause of self-care deficits   - Assess range of motion  - Assess patient's mobility; develop plan if impaired  - Assess patient's need for assistive devices and provide as appropriate  - Encourage maximum independence but intervene and supervise when necessary  - Involve family in performance of ADLs  - Assess for home care needs following discharge   - Consider OT consult to assist with ADL evaluation and planning for discharge  - Provide patient education as appropriate  Outcome: Progressing  Goal: Maintain or return mobility status to optimal level  Description  INTERVENTIONS:  - Assess patient's baseline mobility status (ambulation, transfers, stairs, etc )    - Identify cognitive and physical deficits and behaviors that affect mobility  - Identify mobility aids required to assist with transfers and/or ambulation (gait belt, sit-to-stand, lift, walker, cane, etc )  - [place] fall precautions as indicated by assessment  - Record patient progress and toleration of activity level on Mobility SBAR; progress patient to next Phase/Stage  - Instruct patient to call for assistance with activity based on assessment  - Consider rehabilitation consult to assist with strengthening/weightbearing, etc   Outcome: Progressing     Problem: DISCHARGE PLANNING  Goal: Discharge to home or other facility with appropriate resources  Description  INTERVENTIONS:  - Identify barriers to discharge w/patient and caregiver  - Arrange for needed discharge resources and transportation as appropriate  - Identify discharge learning needs (meds, wound care, etc )  - Arrange for interpretive services to assist at discharge as needed  - Refer to Case Management Department for coordinating discharge planning if the patient needs post-hospital services based on physician/advanced practitioner order or complex needs related to functional status, cognitive ability, or social support system  Outcome: Progressing     Problem: Knowledge Deficit  Goal: Patient/family/caregiver demonstrates understanding of disease process, treatment plan, medications, and discharge instructions  Description  Complete learning assessment and assess knowledge base    Interventions:  - Provide teaching at level of understanding  - Provide teaching via preferred learning methods  Outcome: Progressing

## 2020-02-13 NOTE — DISCHARGE SUMMARY
Discharge Summary - Terrence Garrett 52 y o  female MRN: [id number]    Unit/Bed#: -01 Encounter: [id number]    Admission Date: 2/11/2020     Admitting Diagnosis: Sclerosing mesenteritis ([address] ) [K65 4]  Back pain [M54 9]  Nephrolithiasis [N20 0]  Leukocytosis [D72 829]  Acute flank pain [R10 9]    HPI:  63-year-old female presented with significant right flank pain and mildly elevated LFTs  Consults  Urology  Gastroenterology  Procedures Performed: No orders of the defined types were placed in this encounter  Hospital Course:   Patient was admitted and placed on treatment with steroids and tamoxifen as initial CT scan readings had mentioned mesenteric sclerosis issues  Patient was seen in consultation by General surgery and did not have any acute abdominal process for intervention at that time  She was seen by GI and underwent an EGD on February 13, 2020 with no active bleeding  Patient initially had elevated LFTs which did show improvement and elevated WBCs which showed stabilization also  Patient was felt to be stable for discharge to home on February 13, 2020 and will have repeat labs done in 4-5 days and then follow-up as an outpatient with her primary physician as well as Gastroenterology  Patient did have evidence of the intrarenal calculi on the right and there is some question whether she may have passed a kidney stone but had no further severe flank pain    Significant Findings, Care, Treatment and Services Provided:   General appearance: alert  Neck: no carotid bruit, no JVD and thyroid not enlarged, symmetric, no tenderness/mass/nodules  Lungs: clear to auscultation bilaterally  Heart: regular rate and rhythm, S1, S2 normal, no murmur, click, rub or gallop  Abdomen: no guarding- mild epigastric tenderness  Extremities: extremities normal, warm and well-perfused; no cyanosis, clubbing, or edema  Skin: Skin color, texture, turgor normal  No rashes or lesions  Neurologic: no focal deficits Complications:none    Discharge Diagnosis: Principal Problem:    Acute flank pain  Active Problems:    Leukocytosis    Elevated LFTs    Hypertension    Obesity (BMI 35 0-39 9 without comorbidity)    Fibromyalgia    Tobacco abuse        Condition at Discharge: good     Discharge instructions/Information to patient and family:   See after visit summary for information provided to patient and family  Provisions for Follow-Up Care:  See after visit summary for information related to follow-up care and any pertinent home health orders  Disposition: Home    Planned Readmission: No    Discharge Statement   I spent 38 minutes discharging the patient  This time was spent on the day of discharge  I had direct contact with the patient on the day of discharge  Coordination of care with radiology as well as GI prior to discharge  Discharge Medications:  See after visit summary for reconciled discharge medications provided to patient and family          Nickolas Morejon DO

## 2020-02-14 LAB
ATRIAL RATE: 93 BPM
P AXIS: 27 DEGREES
PR INTERVAL: 172 MS
QRS AXIS: 25 DEGREES
QRSD INTERVAL: 74 MS
QT INTERVAL: 362 MS
QTC INTERVAL: 450 MS
T WAVE AXIS: -2 DEGREES
VENTRICULAR RATE: 93 BPM

## 2020-02-14 PROCEDURE — 93010 ELECTROCARDIOGRAM REPORT: CPT | Performed by: INTERNAL MEDICINE

## 2020-02-14 NOTE — NURSING NOTE
PT IV removed  Reviewed discharge instructions with patient  Pt family member to give her a ride home

## 2020-02-17 ENCOUNTER — HOSPITAL ENCOUNTER (OUTPATIENT)
Dept: RADIOLOGY | Facility: HOSPITAL | Age: 50
Discharge: HOME | End: 2020-02-17
Payer: COMMERCIAL

## 2020-02-17 ENCOUNTER — OFFICE VISIT (OUTPATIENT)
Dept: FAMILY MEDICINE CLINIC | Facility: HOSPITAL | Age: 50
End: 2020-02-17
Payer: COMMERCIAL

## 2020-02-17 ENCOUNTER — TRANSITIONAL CARE MANAGEMENT (OUTPATIENT)
Dept: FAMILY MEDICINE CLINIC | Facility: HOSPITAL | Age: 50
End: 2020-02-17

## 2020-02-17 VITALS
DIASTOLIC BLOOD PRESSURE: 82 MMHG | HEIGHT: 63 IN | HEART RATE: 76 BPM | SYSTOLIC BLOOD PRESSURE: 138 MMHG | WEIGHT: 225.4 LBS | OXYGEN SATURATION: 97 % | BODY MASS INDEX: 39.94 KG/M2 | TEMPERATURE: 98.3 F

## 2020-02-17 DIAGNOSIS — R06.02 SOB (SHORTNESS OF BREATH): ICD-10-CM

## 2020-02-17 DIAGNOSIS — R07.9 CHEST PAIN, UNSPECIFIED TYPE: ICD-10-CM

## 2020-02-17 DIAGNOSIS — R07.81 RIB PAIN ON RIGHT SIDE: Primary | ICD-10-CM

## 2020-02-17 DIAGNOSIS — R07.81 RIB PAIN ON RIGHT SIDE: ICD-10-CM

## 2020-02-17 PROCEDURE — 1111F DSCHRG MED/CURRENT MED MERGE: CPT | Performed by: PHYSICIAN ASSISTANT

## 2020-02-17 PROCEDURE — 3075F SYST BP GE 130 - 139MM HG: CPT | Performed by: PHYSICIAN ASSISTANT

## 2020-02-17 PROCEDURE — 3079F DIAST BP 80-89 MM HG: CPT | Performed by: PHYSICIAN ASSISTANT

## 2020-02-17 PROCEDURE — 3008F BODY MASS INDEX DOCD: CPT | Performed by: PHYSICIAN ASSISTANT

## 2020-02-17 PROCEDURE — 71046 X-RAY EXAM CHEST 2 VIEWS: CPT

## 2020-02-17 PROCEDURE — 1036F TOBACCO NON-USER: CPT | Performed by: PHYSICIAN ASSISTANT

## 2020-02-17 PROCEDURE — 99213 OFFICE O/P EST LOW 20 MIN: CPT | Performed by: PHYSICIAN ASSISTANT

## 2020-02-17 RX ORDER — ACETAMINOPHEN AND CODEINE PHOSPHATE 300; 30 MG/1; MG/1
1 TABLET ORAL EVERY 4 HOURS PRN
Qty: 30 TABLET | Refills: 0 | Status: SHIPPED | OUTPATIENT
Start: 2020-02-17 | End: 2020-11-17 | Stop reason: ALTCHOICE

## 2020-02-17 NOTE — PROGRESS NOTES
Assessment/Plan:         Diagnoses and all orders for this visit:    Rib pain on right side  -     XR chest pa & lateral; Future  -     acetaminophen-codeine (TYLENOL/CODEINE #3) 300-30 MG per tablet; Take 1 tablet by mouth every 4 (four) hours as needed for moderate pain    Chest pain, unspecified type  -     XR chest pa & lateral; Future  -     acetaminophen-codeine (TYLENOL/CODEINE #3) 300-30 MG per tablet; Take 1 tablet by mouth every 4 (four) hours as needed for moderate pain    SOB (shortness of breath)  -     XR chest pa & lateral; Future        Subjective:      Patient ID: Sena Buckley is a 52 y o  female  52year old white female c/o right sided, lower rib pain severe since last week Tuesday-  2/11/2020, went to ER and was admitted, until Thursday night, 2/13/2020  Testing showed kidney stone  Saw general surgeon, and GI specialist    Having hot flashes,         Review of Systems   Constitutional: Positive for fatigue  Negative for chills, diaphoresis and fever  HENT: Negative for congestion, ear pain, rhinorrhea, sore throat and trouble swallowing  Respiratory: Positive for chest tightness and shortness of breath  Negative for cough  Gastrointestinal: Positive for constipation  Negative for abdominal pain, blood in stool, diarrhea, nausea and vomiting  Genitourinary: Positive for flank pain  Negative for dysuria, menstrual problem, vaginal bleeding and vaginal discharge  Musculoskeletal: Positive for back pain and myalgias  Negative for arthralgias, neck pain and neck stiffness  Neurological: Positive for numbness  Objective:      /82   Pulse 76   Temp 98 3 °F (36 8 °C) (Tympanic)   Ht 5' 3" (1 6 m)   Wt 102 kg (225 lb 6 4 oz)   LMP  (LMP Unknown)   SpO2 97%   BMI 39 93 kg/m²          Physical Exam   Constitutional: She is oriented to person, place, and time  She appears well-developed and well-nourished  No distress     Cardiovascular: Normal rate, regular rhythm and normal heart sounds  Pulmonary/Chest: Effort normal and breath sounds normal  No stridor  No respiratory distress  She has no wheezes  She has no rales  Musculoskeletal: Normal range of motion  She exhibits tenderness  She exhibits no edema or deformity  Tenderness around rib lining (back) rib, lower area  Neurological: She is alert and oriented to person, place, and time  No cranial nerve deficit  Coordination normal    Skin: She is not diaphoretic

## 2020-02-17 NOTE — PATIENT INSTRUCTIONS
Recommend probiotics, ex  Align, or [employer]  Sent to get chest area, patient to get MRI results of LS spin, done last April, at [facility]

## 2020-02-18 ENCOUNTER — TRANSCRIBE ORDERS (OUTPATIENT)
Dept: URGENT CARE | Facility: CLINIC | Age: 50
End: 2020-02-18

## 2020-02-18 ENCOUNTER — APPOINTMENT (OUTPATIENT)
Dept: LAB | Facility: CLINIC | Age: 50
End: 2020-02-18
Payer: COMMERCIAL

## 2020-02-18 DIAGNOSIS — E50.9 AVITAMINOSIS A: Primary | ICD-10-CM

## 2020-02-18 DIAGNOSIS — R10.9 ACUTE FLANK PAIN: ICD-10-CM

## 2020-02-18 DIAGNOSIS — E50.9 AVITAMINOSIS A: ICD-10-CM

## 2020-02-18 DIAGNOSIS — R79.89 ELEVATED LFTS: ICD-10-CM

## 2020-02-18 LAB
ALBUMIN SERPL BCP-MCNC: 3.7 G/DL (ref 3.5–5)
ALP SERPL-CCNC: 188 U/L (ref 46–116)
ALT SERPL W P-5'-P-CCNC: 166 U/L (ref 12–78)
ANION GAP SERPL CALCULATED.3IONS-SCNC: 3 MMOL/L (ref 4–13)
AST SERPL W P-5'-P-CCNC: 190 U/L (ref 5–45)
BASOPHILS # BLD AUTO: 0.06 THOUSANDS/ΜL (ref 0–0.1)
BASOPHILS NFR BLD AUTO: 1 % (ref 0–1)
BILIRUB SERPL-MCNC: 0.43 MG/DL (ref 0.2–1)
BUN SERPL-MCNC: 14 MG/DL (ref 5–25)
CALCIUM SERPL-MCNC: 9.6 MG/DL (ref 8.3–10.1)
CHLORIDE SERPL-SCNC: 107 MMOL/L (ref 100–108)
CO2 SERPL-SCNC: 28 MMOL/L (ref 21–32)
CREAT SERPL-MCNC: 0.86 MG/DL (ref 0.6–1.3)
EOSINOPHIL # BLD AUTO: 0.25 THOUSAND/ΜL (ref 0–0.61)
EOSINOPHIL NFR BLD AUTO: 3 % (ref 0–6)
ERYTHROCYTE [DISTWIDTH] IN BLOOD BY AUTOMATED COUNT: 13.3 % (ref 11.6–15.1)
FERRITIN SERPL-MCNC: 33 NG/ML (ref 8–388)
GFR SERPL CREATININE-BSD FRML MDRD: 80 ML/MIN/1.73SQ M
GLUCOSE P FAST SERPL-MCNC: 105 MG/DL (ref 65–99)
HCT VFR BLD AUTO: 47.2 % (ref 34.8–46.1)
HGB BLD-MCNC: 15.2 G/DL (ref 11.5–15.4)
IMM GRANULOCYTES # BLD AUTO: 0.14 THOUSAND/UL (ref 0–0.2)
IMM GRANULOCYTES NFR BLD AUTO: 2 % (ref 0–2)
LYMPHOCYTES # BLD AUTO: 1.27 THOUSANDS/ΜL (ref 0.6–4.47)
LYMPHOCYTES NFR BLD AUTO: 17 % (ref 14–44)
MCH RBC QN AUTO: 30.7 PG (ref 26.8–34.3)
MCHC RBC AUTO-ENTMCNC: 32.2 G/DL (ref 31.4–37.4)
MCV RBC AUTO: 95 FL (ref 82–98)
MONOCYTES # BLD AUTO: 0.51 THOUSAND/ΜL (ref 0.17–1.22)
MONOCYTES NFR BLD AUTO: 7 % (ref 4–12)
NEUTROPHILS # BLD AUTO: 5.2 THOUSANDS/ΜL (ref 1.85–7.62)
NEUTS SEG NFR BLD AUTO: 70 % (ref 43–75)
NRBC BLD AUTO-RTO: 0 /100 WBCS
PLATELET # BLD AUTO: 350 THOUSANDS/UL (ref 149–390)
PMV BLD AUTO: 11.6 FL (ref 8.9–12.7)
POTASSIUM SERPL-SCNC: 5.2 MMOL/L (ref 3.5–5.3)
PROT SERPL-MCNC: 7.8 G/DL (ref 6.4–8.2)
RBC # BLD AUTO: 4.95 MILLION/UL (ref 3.81–5.12)
SODIUM SERPL-SCNC: 138 MMOL/L (ref 136–145)
WBC # BLD AUTO: 7.43 THOUSAND/UL (ref 4.31–10.16)

## 2020-02-18 PROCEDURE — 36415 COLL VENOUS BLD VENIPUNCTURE: CPT | Performed by: INTERNAL MEDICINE

## 2020-02-18 PROCEDURE — 80053 COMPREHEN METABOLIC PANEL: CPT

## 2020-02-18 PROCEDURE — 85025 COMPLETE CBC W/AUTO DIFF WBC: CPT | Performed by: INTERNAL MEDICINE

## 2020-02-18 PROCEDURE — 82728 ASSAY OF FERRITIN: CPT

## 2020-02-20 ENCOUNTER — TELEPHONE (OUTPATIENT)
Dept: FAMILY MEDICINE CLINIC | Facility: HOSPITAL | Age: 50
End: 2020-02-20

## 2020-02-20 NOTE — TELEPHONE ENCOUNTER
I did not order tests so I did not get the results  However, cxr is normal, no abnormalities  Should discuss further with Moreno Wood if needed

## 2020-02-26 ENCOUNTER — LAB (OUTPATIENT)
Dept: LAB | Facility: HOSPITAL | Age: 50
End: 2020-02-26
Attending: INTERNAL MEDICINE
Payer: COMMERCIAL

## 2020-02-26 ENCOUNTER — TRANSCRIBE ORDERS (OUTPATIENT)
Dept: ADMINISTRATIVE | Facility: HOSPITAL | Age: 50
End: 2020-02-26

## 2020-02-26 ENCOUNTER — OFFICE VISIT (OUTPATIENT)
Dept: GASTROENTEROLOGY | Facility: CLINIC | Age: 50
End: 2020-02-26
Payer: COMMERCIAL

## 2020-02-26 VITALS
HEART RATE: 72 BPM | BODY MASS INDEX: 39.87 KG/M2 | DIASTOLIC BLOOD PRESSURE: 86 MMHG | WEIGHT: 225 LBS | HEIGHT: 63 IN | SYSTOLIC BLOOD PRESSURE: 116 MMHG

## 2020-02-26 DIAGNOSIS — R74.8 ELEVATED LIVER ENZYMES: ICD-10-CM

## 2020-02-26 DIAGNOSIS — R74.8 ACID PHOSPHATASE ELEVATED: Primary | ICD-10-CM

## 2020-02-26 DIAGNOSIS — R74.8 ELEVATED LIVER ENZYMES: Primary | ICD-10-CM

## 2020-02-26 DIAGNOSIS — Z12.11 COLON CANCER SCREENING: ICD-10-CM

## 2020-02-26 DIAGNOSIS — R10.11 RIGHT UPPER QUADRANT ABDOMINAL PAIN: ICD-10-CM

## 2020-02-26 DIAGNOSIS — R74.8 ACID PHOSPHATASE ELEVATED: ICD-10-CM

## 2020-02-26 DIAGNOSIS — E66.9 OBESITY (BMI 35.0-39.9 WITHOUT COMORBIDITY): Chronic | ICD-10-CM

## 2020-02-26 DIAGNOSIS — Z98.84 HISTORY OF ROUX-EN-Y GASTRIC BYPASS: ICD-10-CM

## 2020-02-26 DIAGNOSIS — D64.9 ANEMIA, UNSPECIFIED TYPE: ICD-10-CM

## 2020-02-26 LAB
ALBUMIN SERPL BCP-MCNC: 3.9 G/DL (ref 3.5–5)
ALP SERPL-CCNC: 177 U/L (ref 46–116)
ALT SERPL W P-5'-P-CCNC: 41 U/L (ref 12–78)
AST SERPL W P-5'-P-CCNC: 21 U/L (ref 5–45)
BILIRUB DIRECT SERPL-MCNC: 0.08 MG/DL (ref 0–0.2)
BILIRUB SERPL-MCNC: 0.3 MG/DL (ref 0.2–1)
HAV AB SER QL IA: NORMAL
HBV CORE IGM SER QL: NORMAL
HBV SURFACE AB SER-ACNC: <3.1 MIU/ML
HBV SURFACE AG SER QL: NORMAL
HCV AB SER QL: NORMAL
PROT SERPL-MCNC: 8.1 G/DL (ref 6.4–8.2)

## 2020-02-26 PROCEDURE — 83883 ASSAY NEPHELOMETRY NOT SPEC: CPT

## 2020-02-26 PROCEDURE — 86803 HEPATITIS C AB TEST: CPT

## 2020-02-26 PROCEDURE — 36415 COLL VENOUS BLD VENIPUNCTURE: CPT

## 2020-02-26 PROCEDURE — 86706 HEP B SURFACE ANTIBODY: CPT

## 2020-02-26 PROCEDURE — 87340 HEPATITIS B SURFACE AG IA: CPT

## 2020-02-26 PROCEDURE — 82247 BILIRUBIN TOTAL: CPT

## 2020-02-26 PROCEDURE — 82977 ASSAY OF GGT: CPT

## 2020-02-26 PROCEDURE — 82390 ASSAY OF CERULOPLASMIN: CPT

## 2020-02-26 PROCEDURE — 86256 FLUORESCENT ANTIBODY TITER: CPT

## 2020-02-26 PROCEDURE — 83010 ASSAY OF HAPTOGLOBIN QUANT: CPT

## 2020-02-26 PROCEDURE — 80076 HEPATIC FUNCTION PANEL: CPT

## 2020-02-26 PROCEDURE — 82784 ASSAY IGA/IGD/IGG/IGM EACH: CPT

## 2020-02-26 PROCEDURE — 84460 ALANINE AMINO (ALT) (SGPT): CPT

## 2020-02-26 PROCEDURE — 99244 OFF/OP CNSLTJ NEW/EST MOD 40: CPT | Performed by: INTERNAL MEDICINE

## 2020-02-26 PROCEDURE — 83516 IMMUNOASSAY NONANTIBODY: CPT

## 2020-02-26 PROCEDURE — 86708 HEPATITIS A ANTIBODY: CPT

## 2020-02-26 PROCEDURE — 86235 NUCLEAR ANTIGEN ANTIBODY: CPT

## 2020-02-26 PROCEDURE — 86255 FLUORESCENT ANTIBODY SCREEN: CPT

## 2020-02-26 PROCEDURE — 82172 ASSAY OF APOLIPOPROTEIN: CPT

## 2020-02-26 PROCEDURE — 86038 ANTINUCLEAR ANTIBODIES: CPT

## 2020-02-26 PROCEDURE — 86705 HEP B CORE ANTIBODY IGM: CPT

## 2020-02-26 NOTE — LETTER
February 26, 2020     MD Ramona Mora  [address] [address]    Patient: Ruby Bagley   YOB: 1970   Date of Visit: 2/26/2020       Dear Dr Sowmya Post: Thank you for referring Reinaldo Foster to me for evaluation  Below are my notes for this consultation  If you have questions, please do not hesitate to call me  I look forward to following your patient along with you  Sincerely,        Glady Ahumada, MD        CC: No Recipients  Glady Ahumada, MD  2/26/2020  1:40 PM  Incomplete    [facility] - Outpatient Consultation  Ruby Bagley 52 y o  female MRN: [id number]  Encounter: [id number]    ASSESSMENT AND PLAN:      1  Elevated liver enzymes  49-year-old female referred to us by Dr Sowmya Post for elevated LFTs  Patient was admitted to the hospital for some abdominal pain with only mild elevations of LFTs at that time  Neg EGD and CT A/P w IV Con  Outpatient blood work last week showed that the AST/ALT have now climb to 190/166  She is status post cholecystectomy  Unclear if this is part of autoimmune hepatitis given her chronic fatigue versus fatty liver  - Hepatic Function Test  - HCV FIBROSURE; Future  - Hepatitis B surface antigen; Future  - Hepatitis A antibody, total; Future  - Hepatitis B core antibody, IgM; Future  - Hepatitis B surface antibody; Future  - Hepatitis C antibody; Future  - SHAHANA Screen w/ Reflex to Titer/Pattern; Future  - Antimitochondrial antibody; Future  - Anti-smooth muscle antibody, IgG; Future  - Celiac Disease Antibody Profile; Future  - Ceruloplasmin; Future    - US abdomen complete; Future      2  Right upper quadrant abdominal pain  Vague abdominal wall pain near the surgical scar of her gastric bypass  Negative CT an EGD  - consider local injection with pain management    3  Obesity (BMI 35 0-39 9 without comorbidity)  Weight loss recommended    4   Colon cancer screening  Adopted so unknown family history  Had a colonoscopy many years ago at [facility], does not know the location  At this point, can started age 48      11  Anemia, unspecified type  Patient states that she has a history of anemia with IV iron infusions  She states that she is supposed to get another IV iron infusion next week although her hemoglobin has been normal through this whole time  She does have a history of bypass, and unclear if she gets iron infusions prophylactically  Regardless at this point no evidence of iron deficiency anemia  I advised that she wait for her next iron infusion  6  History of Murali-en-Y gastric bypass      Followup Appointment:  4-6 weeks  ______________________________________________________________________    Chief Complaint   Patient presents with   [facility] Follow up       HPI:   aKylee Marroquin is a 52y o  year old female who presents today at the request of Dr Amos Stone for elevated LFTs  Patient was admitted at the hospital earlier this month for abdominal pain  At that time EGD and CT were negative  The pain and the nausea have mostly subsided except for some localized tenderness over the scar  However, outpatient follow-up labs by her PCP last week showed elevated AST and ALT to 190 and 166 respectively  She has a history of cholecystectomy already  She has been complaining of lot of fatigue over the past several months  Denies any jaundice  No changes in bowels  Does have loose stools at baseline alternating with days of constipation  Denies bleeding  Weight is up  Appetite is fine      Historical Information   Past Medical History:   Diagnosis Date    Allergic     Depression     GERD (gastroesophageal reflux disease)     Hypertension     PONV (postoperative nausea and vomiting)     Tachycardia      Past Surgical History:   Procedure Laterality Date    ANTERIOR CRUCIATE LIGAMENT REPAIR      CHOLECYSTECTOMY      GASTRIC BYPASS      HYSTERECTOMY      INGUINAL HERNIA REPAIR      JOINT REPLACEMENT      KNEE ARTHROSCOPY Right     ORIF FINGER / THUMB FRACTURE Right     thumb surgery    [facility] LARYNGOSCOPY,DIRCT,OP SCOPE,BIOPSY N/A 2018    Procedure: LARYNGOSCOPY DIRECT;  Surgeon: Nazia Somers MD;  Location: [facility];  Service: ENT    PYELOPLASTY      REPLACEMENT TOTAL KNEE Right     ROTATOR CUFF REPAIR       Social History     Substance and Sexual Activity   Alcohol Use Yes    Frequency: 2-3 times a week    Drinks per session: 1 or 2    Binge frequency: Never    Comment: Occasional     Social History     Substance and Sexual Activity   Drug Use No     Social History     Tobacco Use   Smoking Status Former Smoker    Packs/day: 1 00    Types: Cigarettes    Last attempt to quit: 2020    Years since quittin 0   Smokeless Tobacco Never Used     Family History   Adopted: Yes   Problem Relation Age of Onset    Diabetes Mother     Mental illness Mother         disorder    Hypertension Mother         benign    Substance Abuse Mother     Mental illness Father         disorder    Substance Abuse Father     Mental illness Family         disorder    Substance Abuse Family     Colon cancer Neg Hx     Colon polyps Neg Hx        Meds/Allergies     Current Outpatient Medications:     acetaminophen (TYLENOL) 325 mg tablet    acetaminophen-codeine (TYLENOL/CODEINE #3) 300-30 MG per tablet    DULoxetine (CYMBALTA) 60 mg delayed release capsule    gabapentin (NEURONTIN) 300 mg capsule    lisinopril (ZESTRIL) 20 mg tablet    metoprolol succinate (TOPROL-XL) 50 mg 24 hr tablet    montelukast (SINGULAIR) 10 mg tablet    nicotine (NICODERM CQ) 7 mg/24hr TD 24 hr patch    terbinafine (LamISIL) 250 mg tablet    Allergies   Allergen Reactions    Sulfamethoxazole-Trimethoprim Hives and Rash    Ferumoxytol Other (See Comments)     Caused Hypertension and hard time breathing    Sulfa Antibiotics Rash       PHYSICAL EXAM:    Blood pressure 116/86, pulse 72, height 5' 3" (1 6 m), weight 102 kg (225 lb)  Body mass index is 39 86 kg/m²  General Appearance: NAD, cooperative, alert  Eyes: Anicteric, PERRLA, EOMI  ENT:  Normocephalic, atraumatic, normal mucosa  Neck:  Supple, symmetrical, trachea midline,   Resp:  Clear to auscultation bilaterally; no rales, rhonchi or wheezing; respirations unlabored   CV:  S1 S2, Regular rate and rhythm; no murmur, rub, or gallop  GI:  Soft, non-tender, non-distended; normal bowel sounds; no masses, no organomegaly   Rectal: Deferred  Musculoskeletal: No cyanosis, clubbing or edema  Normal ROM  Skin:  No jaundice, rashes, or lesions   Heme/Lymph: No palpable cervical lymphadenopathy  Psych: Normal affect, good eye contact  Neuro: No gross deficits, AAOx3    Lab Results:   Lab Results   Component Value Date    WBC 7 43 02/18/2020    HGB 15 2 02/18/2020    HCT 47 2 (H) 02/18/2020    MCV 95 02/18/2020     02/18/2020     Lab Results   Component Value Date    K 5 2 02/18/2020     02/18/2020    CO2 28 02/18/2020    BUN 14 02/18/2020    CREATININE 0 86 02/18/2020    GLUF 105 (H) 02/18/2020    CALCIUM 9 6 02/18/2020     (H) 02/18/2020     (H) 02/18/2020    ALKPHOS 188 (H) 02/18/2020    EGFR 80 02/18/2020     Lab Results   Component Value Date    FERRITIN 33 02/18/2020     No results found for: LIPASE    Radiology Results:   Xr Chest Pa & Lateral    Result Date: 2/18/2020  Narrative: CHEST INDICATION:   R07 81: Pleurodynia R07 9: Chest pain, unspecified R06 02: Shortness of breath  COMPARISON:  None EXAM PERFORMED/VIEWS:  XR CHEST PA & LATERAL FINDINGS: Cardiomediastinal silhouette appears unremarkable  The lungs are clear  No pneumothorax or pleural effusion  Osseous structures appear within normal limits for patient age  There are right upper quadrant surgical clips from prior cholecystectomy  Impression: No acute cardiopulmonary disease   Workstation performed: [id number]     Ct Renal Stone Study Abdomen Pelvis Wo Contrast    Result Date: 2/11/2020  Narrative: CT ABDOMEN AND PELVIS WITHOUT IV CONTRAST - LOW DOSE RENAL STONE INDICATION:   Right flank pain    COMPARISON:  None  TECHNIQUE:  Low dose thin section CT examination of the abdomen and pelvis was performed without intravenous or oral contrast according to a protocol specifically designed to evaluate for urinary tract calculus  Axial, sagittal, and coronal 2D reformatted images were created from the source data and submitted for interpretation  Evaluation for pathology in the abdomen and pelvis that is unrelated to urinary tract calculi is limited  Radiation dose length product (DLP) for this visit:  570 mGy-cm   This examination, like all CT scans performed in the [facility], was performed utilizing techniques to minimize radiation dose exposure, including the use of iterative reconstruction and automated exposure control  FINDINGS: RIGHT KIDNEY AND URETER: Punctate 1 to 2 mm calculus in the upper pole the left kidney (series 601, image 106)  No hydronephrosis  LEFT KIDNEY AND URETER: No urinary tract calculi  No hydronephrosis or hydroureter  URINARY BLADDER: Unremarkable  No significant abnormality in the visualized lung bases  Mildly atrophic pancreas  Otherwise limited low radiation dose noncontrast CT evaluation demonstrates no clinically significant abnormality of liver, spleen, pancreas, or adrenal glands  The gallbladder is surgically absent  No ascites or bulky lymphadenopathy on this limited noncontrast study  Mild stranding within the central mesentery about small mesenteric lymph nodes  Status post gastric bypass  No bowel obstruction  The appendix is well seen and there is no evidence of acute appendicitis  Status post hysterectomy  No acute fracture or destructive osseous lesion is identified  Impression: 1  Punctate nonobstructive 1 to 2 mm calculus in the upper pole of the right kidney  No hydronephrosis   2   Mild stranding within the central mesentery about small mesenteric lymph nodes, nonspecific however may be seen in setting of sclerosing mesenteritis  Workstation performed: [id number]     Ct Abdomen Pelvis W Contrast    Result Date: 2/13/2020  Narrative: CT ABDOMEN AND PELVIS WITH IV CONTRAST INDICATION:   Abdominal pain, acute, nonlocalized  Diffuse abdominal pain, possible sclerosing mesenteritis seen on CT without contrast   Right flank pain  COMPARISON:  February 11, 2020  TECHNIQUE:  CT examination of the abdomen and pelvis was performed  Axial, sagittal, and coronal 2D reformatted images were created from the source data and submitted for interpretation  Radiation dose length product (DLP) for this visit:  1009 29 mGy-cm   This examination, like all CT scans performed in the [facility], was performed utilizing techniques to minimize radiation dose exposure, including the use of iterative reconstruction and automated exposure control  IV Contrast: 100 mL of iohexol (OMNIPAQUE) Enteric Contrast:  Enteric contrast was administered  FINDINGS: ABDOMEN LOWER CHEST:  Linear markings in the costophrenic angles most consistent with atelectasis or scarring  LIVER/BILIARY TREE:  There is prominence of common bile duct and central intrahepatic biliary tree most consistent with the sequela of prior cholecystectomy  Liver is otherwise unremarkable  GALLBLADDER:  Gallbladder is surgically absent  SPLEEN:  Unremarkable  PANCREAS:  Unremarkable  ADRENAL GLANDS:  Unremarkable  KIDNEYS/URETERS:  There is atypical appearance of the right kidney 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 and 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 pole moiety  Punctate calcific density at the upper pole the right kidney appears to represent small parenchymal calcification or perhaps a nonobstructing 1 to 2 mm intrarenal calculus  No left intrarenal calculi  No significant hydronephrosis  Symmetric renal nephrograms  STOMACH AND BOWEL:  There is surgical change of Murali-en-Y gastric bypass surgery  Surgical changes of prior hiatal hernia repair noted  There is a distended and air-filled viscus in the lower posterior mediastinum just above the gastric pouch, possibly  distended distal esophagus  Visualized small and large bowel loops appear unremarkable  Specifically, no bowel obstruction or other late complication of Murali-en-Y gastric bypass surgery  Unremarkable appearance of the large bowel  APPENDIX:  No findings to suggest appendicitis  ABDOMINOPELVIC CAVITY:  No ascites  No pneumoperitoneum  Normal sized lymph nodes within the small bowel mesentery and extremely subtle groundglass change, more apparent on the noncontrast examination, are of no clinical significance  A diagnosis of acute abdominal pain related to sclerosing mesentery this is not supported by the current CT appearance  More likely, the subtle groundglass change in the mesentery is likely due to slight mesenteric distortion related to prior Murali-en-Y gastric bypass surgery  VESSELS:  Unremarkable for patient's age  PELVIS REPRODUCTIVE ORGANS:  Surgical changes of prior hysterectomy  URINARY BLADDER:  Unremarkable  ABDOMINAL WALL/INGUINAL REGIONS:  Unremarkable  OSSEOUS STRUCTURES:  No acute fracture or destructive osseous lesion  Impression: No acute CT abnormality in the abdomen or pelvis to definitively account for the patient's abdominal pain  Normal sized lymph nodes within the small bowel mesentery and extremely subtle groundglass change, more apparent on the noncontrast examination, are of no clinical significance  Specifically, a diagnosis of acute abdominal pain related to sclerosing mesentery this is not supported by the current CT appearance  More likely, the subtle groundglass change in the mesentery is likely due to slight mesenteric distortion related to prior Murali-en-Y gastric bypass surgery   Surgical changes of prior hiatal hernia repair and Murali-en-Y gastric bypass surgery  There is distention of air filled structure in the lower posterior mediastinum just above the level of the gastric pouch which probably represents distended air-filled esophagus  Atypical morphology of the right kidney with an appearance most suspicious for duplication of renal collecting system and mild congenital UPJ obstruction of the lower pole moiety but of unlikely clinical significance given the absence of significant hydronephrosis, uniform bilateral renal nephrograms and no evidence of renal scarring  Workstation performed: [id number]       REVIEW OF SYSTEMS:    CONSTITUTIONAL: Denies any fever, chills, rigors, and weight loss  HEENT: No earache or tinnitus  Denies hearing loss or visual disturbances  CARDIOVASCULAR: No chest pain or palpitations  RESPIRATORY: Denies any cough, hemoptysis, shortness of breath or dyspnea on exertion  GASTROINTESTINAL: As noted in the History of Present Illness  GENITOURINARY: No problems with urination  Denies any hematuria or dysuria  NEUROLOGIC: No dizziness or vertigo, denies headaches  MUSCULOSKELETAL: Denies any muscle or joint pain  SKIN: Denies skin rashes or itching  ENDOCRINE: Denies excessive thirst  Denies intolerance to heat or cold  PSYCHOSOCIAL: Denies depression or anxiety  Denies any recent memory loss

## 2020-02-26 NOTE — LETTER
February 26, 2020     Mohinder Yip MD  [address]  [facility] [address] [place] [address]    Patient: Kaylee Marroquin   YOB: 1970   Date of Visit: 2/26/2020       Dear Dr Donnell Rushing: Thank you for referring Ilana Kemp to me for evaluation  Below are my notes for this consultation  If you have questions, please do not hesitate to call me  I look forward to following your patient along with you  Sincerely,        Norberto Amaral MD        CC: No Recipients  Norberto Amaral MD  2/26/2020  1:41 PM  Signed    [id number] - Outpatient Consultation  Kaylee Marroquin 52 y o  female MRN: [id number]  Encounter: [id number]    ASSESSMENT AND PLAN:      1  Elevated liver enzymes  66-year-old female referred to us by Dr Amos Stone for elevated LFTs  Patient was admitted to the hospital for some abdominal pain with only mild elevations of LFTs at that time  Neg EGD and CT A/P w IV Con  Outpatient blood work last week showed that the AST/ALT have now climb to 190/166  She is status post cholecystectomy  Unclear if this is part of autoimmune hepatitis given her chronic fatigue versus fatty liver  - Hepatic Function Test  - HCV FIBROSURE; Future  - Hepatitis B surface antigen; Future  - Hepatitis A antibody, total; Future  - Hepatitis B core antibody, IgM; Future  - Hepatitis B surface antibody; Future  - Hepatitis C antibody; Future  - SHAHANA Screen w/ Reflex to Titer/Pattern; Future  - Antimitochondrial antibody; Future  - Anti-smooth muscle antibody, IgG; Future  - Celiac Disease Antibody Profile; Future  - Ceruloplasmin; Future    - US abdomen complete; Future      2  Right upper quadrant abdominal pain  Vague abdominal wall pain near the surgical scar of her gastric bypass  Negative CT an EGD  - consider local injection with pain management    3  Obesity (BMI 35 0-39 9 without comorbidity)  Weight loss recommended    4   Colon cancer screening  Adopted so unknown family history  Had a colonoscopy many years ago at [facility], does not know the location  At this point, can started age 48      11  Anemia, unspecified type  Patient states that she has a history of anemia with IV iron infusions  She states that she is supposed to get another IV iron infusion next week although her hemoglobin has been normal through this whole time  She does have a history of bypass, and unclear if she gets iron infusions prophylactically  Regardless at this point no evidence of iron deficiency anemia  I advised that she wait for her next iron infusion  6  History of Murali-en-Y gastric bypass      Followup Appointment:  4-6 weeks  ______________________________________________________________________    Chief Complaint   Patient presents with   [facility] Follow up       HPI:   Russella Gowers is a 52y o  year old female who presents today at the request of Dr Stephanie Colón for elevated LFTs  Patient was admitted at the hospital earlier this month for abdominal pain  At that time EGD and CT were negative  The pain and the nausea have mostly subsided except for some localized tenderness over the scar  However, outpatient follow-up labs by her PCP last week showed elevated AST and ALT to 190 and 166 respectively  She has a history of cholecystectomy already  She has been complaining of lot of fatigue over the past several months  Denies any jaundice  No changes in bowels  Does have loose stools at baseline alternating with days of constipation  Denies bleeding  Weight is up  Appetite is fine      Historical Information   Past Medical History:   Diagnosis Date    Allergic     Depression     GERD (gastroesophageal reflux disease)     Hypertension     PONV (postoperative nausea and vomiting)     Tachycardia      Past Surgical History:   Procedure Laterality Date    ANTERIOR CRUCIATE LIGAMENT REPAIR      CHOLECYSTECTOMY      GASTRIC BYPASS      HYSTERECTOMY      INGUINAL HERNIA REPAIR      JOINT REPLACEMENT      KNEE ARTHROSCOPY Right     ORIF FINGER / THUMB FRACTURE Right     thumb surgery    [place] LARYNGOSCOPY,DIRCT,OP SCOPE,BIOPSY N/A 2018    Procedure: LARYNGOSCOPY DIRECT;  Surgeon: Michael Skinner MD;  Location: [facility];  Service: ENT    PYELOPLASTY      REPLACEMENT TOTAL KNEE Right     ROTATOR CUFF REPAIR       Social History     Substance and Sexual Activity   Alcohol Use Yes    Frequency: 2-3 times a week    Drinks per session: 1 or 2    Binge frequency: Never    Comment: Occasional     Social History     Substance and Sexual Activity   Drug Use No     Social History     Tobacco Use   Smoking Status Former Smoker    Packs/day: 1 00    Types: Cigarettes    Last attempt to quit: 2020    Years since quittin 0   Smokeless Tobacco Never Used     Family History   Adopted: Yes   Problem Relation Age of Onset    Diabetes Mother     Mental illness Mother         disorder    Hypertension Mother         benign    Substance Abuse Mother     Mental illness Father         disorder    Substance Abuse Father     Mental illness Family         disorder    Substance Abuse Family     Colon cancer Neg Hx     Colon polyps Neg Hx        Meds/Allergies     Current Outpatient Medications:     acetaminophen (TYLENOL) 325 mg tablet    acetaminophen-codeine (TYLENOL/CODEINE #3) 300-30 MG per tablet    DULoxetine (CYMBALTA) 60 mg delayed release capsule    gabapentin (NEURONTIN) 300 mg capsule    lisinopril (ZESTRIL) 20 mg tablet    metoprolol succinate (TOPROL-XL) 50 mg 24 hr tablet    montelukast (SINGULAIR) 10 mg tablet    nicotine (NICODERM CQ) 7 mg/24hr TD 24 hr patch    terbinafine (LamISIL) 250 mg tablet    Allergies   Allergen Reactions    Sulfamethoxazole-Trimethoprim Hives and Rash    Ferumoxytol Other (See Comments)     Caused Hypertension and hard time breathing    Sulfa Antibiotics Rash       PHYSICAL EXAM:    Blood pressure 116/86, pulse 72, height 5' 3" (1 6 m), weight 102 kg (225 lb)  Body mass index is 39 86 kg/m²  General Appearance: NAD, cooperative, alert  Eyes: Anicteric, PERRLA, EOMI  ENT:  Normocephalic, atraumatic, normal mucosa  Neck:  Supple, symmetrical, trachea midline,   Resp:  Clear to auscultation bilaterally; no rales, rhonchi or wheezing; respirations unlabored   CV:  S1 S2, Regular rate and rhythm; no murmur, rub, or gallop  GI:  Soft, non-tender, non-distended; normal bowel sounds; no masses, no organomegaly   Rectal: Deferred  Musculoskeletal: No cyanosis, clubbing or edema  Normal ROM  Skin:  No jaundice, rashes, or lesions   Heme/Lymph: No palpable cervical lymphadenopathy  Psych: Normal affect, good eye contact  Neuro: No gross deficits, AAOx3    Lab Results:   Lab Results   Component Value Date    WBC 7 43 02/18/2020    HGB 15 2 02/18/2020    HCT 47 2 (H) 02/18/2020    MCV 95 02/18/2020     02/18/2020     Lab Results   Component Value Date    K 5 2 02/18/2020     02/18/2020    CO2 28 02/18/2020    BUN 14 02/18/2020    CREATININE 0 86 02/18/2020    GLUF 105 (H) 02/18/2020    CALCIUM 9 6 02/18/2020     (H) 02/18/2020     (H) 02/18/2020    ALKPHOS 188 (H) 02/18/2020    EGFR 80 02/18/2020     Lab Results   Component Value Date    FERRITIN 33 02/18/2020     No results found for: LIPASE    Radiology Results:   Xr Chest Pa & Lateral    Result Date: 2/18/2020  Narrative: CHEST INDICATION:   R07 81: Pleurodynia R07 9: Chest pain, unspecified R06 02: Shortness of breath  COMPARISON:  None EXAM PERFORMED/VIEWS:  XR CHEST PA & LATERAL FINDINGS: Cardiomediastinal silhouette appears unremarkable  The lungs are clear  No pneumothorax or pleural effusion  Osseous structures appear within normal limits for patient age  There are right upper quadrant surgical clips from prior cholecystectomy  Impression: No acute cardiopulmonary disease   Workstation performed: [id number]     Ct Renal Stone Study Abdomen Pelvis Wo Contrast    Result Date: 2/11/2020  Narrative: CT ABDOMEN AND PELVIS WITHOUT IV CONTRAST - LOW DOSE RENAL STONE INDICATION:   Right flank pain    COMPARISON:  None  TECHNIQUE:  Low dose thin section CT examination of the abdomen and pelvis was performed without intravenous or oral contrast according to a protocol specifically designed to evaluate for urinary tract calculus  Axial, sagittal, and coronal 2D reformatted images were created from the source data and submitted for interpretation  Evaluation for pathology in the abdomen and pelvis that is unrelated to urinary tract calculi is limited  Radiation dose length product (DLP) for this visit:  570 mGy-cm   This examination, like all CT scans performed in the [facility], was performed utilizing techniques to minimize radiation dose exposure, including the use of iterative reconstruction and automated exposure control  FINDINGS: RIGHT KIDNEY AND URETER: Punctate 1 to 2 mm calculus in the upper pole the left kidney (series 601, image 106)  No hydronephrosis  LEFT KIDNEY AND URETER: No urinary tract calculi  No hydronephrosis or hydroureter  URINARY BLADDER: Unremarkable  No significant abnormality in the visualized lung bases  Mildly atrophic pancreas  Otherwise limited low radiation dose noncontrast CT evaluation demonstrates no clinically significant abnormality of liver, spleen, pancreas, or adrenal glands  The gallbladder is surgically absent  No ascites or bulky lymphadenopathy on this limited noncontrast study  Mild stranding within the central mesentery about small mesenteric lymph nodes  Status post gastric bypass  No bowel obstruction  The appendix is well seen and there is no evidence of acute appendicitis  Status post hysterectomy  No acute fracture or destructive osseous lesion is identified  Impression: 1  Punctate nonobstructive 1 to 2 mm calculus in the upper pole of the right kidney  No hydronephrosis   2   Mild stranding within the central mesentery about small mesenteric lymph nodes, nonspecific however may be seen in setting of sclerosing mesenteritis  Workstation performed: [id number]     Ct Abdomen Pelvis W Contrast    Result Date: 2/13/2020  Narrative: CT ABDOMEN AND PELVIS WITH IV CONTRAST INDICATION:   Abdominal pain, acute, nonlocalized  Diffuse abdominal pain, possible sclerosing mesenteritis seen on CT without contrast   Right flank pain  COMPARISON:  February 11, 2020  TECHNIQUE:  CT examination of the abdomen and pelvis was performed  Axial, sagittal, and coronal 2D reformatted images were created from the source data and submitted for interpretation  Radiation dose length product (DLP) for this visit:  1009 29 mGy-cm   This examination, like all CT scans performed in the [facility], was performed utilizing techniques to minimize radiation dose exposure, including the use of iterative reconstruction and automated exposure control  IV Contrast: 100 mL of iohexol (OMNIPAQUE) Enteric Contrast:  Enteric contrast was administered  FINDINGS: ABDOMEN LOWER CHEST:  Linear markings in the costophrenic angles most consistent with atelectasis or scarring  LIVER/BILIARY TREE:  There is prominence of common bile duct and central intrahepatic biliary tree most consistent with the sequela of prior cholecystectomy  Liver is otherwise unremarkable  GALLBLADDER:  Gallbladder is surgically absent  SPLEEN:  Unremarkable  PANCREAS:  Unremarkable  ADRENAL GLANDS:  Unremarkable  KIDNEYS/URETERS:  There is atypical appearance of the right kidney 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 and 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 pole moiety  Punctate calcific density at the upper pole the right kidney appears to represent small parenchymal calcification or perhaps a nonobstructing 1 to 2 mm intrarenal calculus  No left intrarenal calculi  No significant hydronephrosis  Symmetric renal nephrograms  STOMACH AND BOWEL:  There is surgical change of Murali-en-Y gastric bypass surgery  Surgical changes of prior hiatal hernia repair noted  There is a distended and air-filled viscus in the lower posterior mediastinum just above the gastric pouch, possibly  distended distal esophagus  Visualized small and large bowel loops appear unremarkable  Specifically, no bowel obstruction or other late complication of Murali-en-Y gastric bypass surgery  Unremarkable appearance of the large bowel  APPENDIX:  No findings to suggest appendicitis  ABDOMINOPELVIC CAVITY:  No ascites  No pneumoperitoneum  Normal sized lymph nodes within the small bowel mesentery and extremely subtle groundglass change, more apparent on the noncontrast examination, are of no clinical significance  A diagnosis of acute abdominal pain related to sclerosing mesentery this is not supported by the current CT appearance  More likely, the subtle groundglass change in the mesentery is likely due to slight mesenteric distortion related to prior Murali-en-Y gastric bypass surgery  VESSELS:  Unremarkable for patient's age  PELVIS REPRODUCTIVE ORGANS:  Surgical changes of prior hysterectomy  URINARY BLADDER:  Unremarkable  ABDOMINAL WALL/INGUINAL REGIONS:  Unremarkable  OSSEOUS STRUCTURES:  No acute fracture or destructive osseous lesion  Impression: No acute CT abnormality in the abdomen or pelvis to definitively account for the patient's abdominal pain  Normal sized lymph nodes within the small bowel mesentery and extremely subtle groundglass change, more apparent on the noncontrast examination, are of no clinical significance  Specifically, a diagnosis of acute abdominal pain related to sclerosing mesentery this is not supported by the current CT appearance  More likely, the subtle groundglass change in the mesentery is likely due to slight mesenteric distortion related to prior Murali-en-Y gastric bypass surgery   Surgical changes of prior hiatal hernia repair and Murali-en-Y gastric bypass surgery  There is distention of air filled structure in the lower posterior mediastinum just above the level of the gastric pouch which probably represents distended air-filled esophagus  Atypical morphology of the right kidney with an appearance most suspicious for duplication of renal collecting system and mild congenital UPJ obstruction of the lower pole moiety but of unlikely clinical significance given the absence of significant hydronephrosis, uniform bilateral renal nephrograms and no evidence of renal scarring  Workstation performed: [id number]       REVIEW OF SYSTEMS:    CONSTITUTIONAL: Denies any fever, chills, rigors, and weight loss  HEENT: No earache or tinnitus  Denies hearing loss or visual disturbances  CARDIOVASCULAR: No chest pain or palpitations  RESPIRATORY: Denies any cough, hemoptysis, shortness of breath or dyspnea on exertion  GASTROINTESTINAL: As noted in the History of Present Illness  GENITOURINARY: No problems with urination  Denies any hematuria or dysuria  NEUROLOGIC: No dizziness or vertigo, denies headaches  MUSCULOSKELETAL: Denies any muscle or joint pain  SKIN: Denies skin rashes or itching  ENDOCRINE: Denies excessive thirst  Denies intolerance to heat or cold  PSYCHOSOCIAL: Denies depression or anxiety  Denies any recent memory loss

## 2020-02-26 NOTE — PATIENT INSTRUCTIONS
Lactose-Controlled Diet   WHAT YOU NEED TO KNOW:   A lactose-controlled diet includes foods that contain either small amounts of lactose (low lactose) or no lactose at all (lactose free)  Lactose is a sugar found in dairy foods, such as milk, cheese, and yogurt  You may need to follow this diet if you have gas, bloating, cramping, or diarrhea after you eat these foods  These symptoms occur when your body does not produce enough lactase  Lactase is the enzyme that helps your body digest lactose  This condition is called lactose intolerance  You may be able to follow a low-lactose diet if you are able to eat some dairy foods  If you cannot tolerate any dairy foods, you will need to follow a lactose-free diet  DISCHARGE INSTRUCTIONS:   How to find the amount of lactose you can tolerate: Work with your dietitian to find the amount of lactose you can have each day  You may be able to find this amount by trying small amounts of lactose at a time  · Try a food or drink that contains a low amount of lactose, such as reduced-lactose milk  · Eat or drink dairy foods that are easier to digest, such as yogurt and buttermilk  · Try only a small amount of dairy at a time, such as ¼ cup of milk or ½ ounce of cheese  · Only eat or drink 1 dairy food each day, and then slowly increase the amount you eat each day  · Only eat or drink 1 dairy food at a meal     · Have dairy foods or drinks with a meal or snack instead of eating or drinking them alone  · Try taking the lactase enzyme in pill or liquid form before you eat or drink dairy foods  This enzyme may help to prevent symptoms when you eat food with lactose  Foods to limit or avoid:  Limit or avoid milk (regular, condensed, powdered), yogurt, cheese, ice cream, and other dairy foods  Always read the ingredient labels before you buy any packaged foods  Limit or avoid foods that contain milk, milk solids, butter, buttermilk, cream, and whey   Even foods like margarine, nondairy creamer, baked goods, and salad dressings may contain some lactose  Instant soup or potatoes, beverage mixes, and pancake or cake mixes may also contain some lactose  Foods to include:   · Lactose-free foods:      ¨ Lactose-free, almond, rice, or soy milk    ¨ Soy yogurt or soy cheese    ¨ [place] milk cheese    ¨ Soy-based sour cream    ¨ Foods that contain casein, lactate, lactic acid, and lactalbumin (these come from milk, but do not contain lactose)    · Low-lactose foods:      ¨ Reduced-lactose milk    ¨ Aged cheese, such as Swiss, cheddar, or parmesan    ¨ Cream cheese    ¨ Cottage or ricotta cheese    ¨ Nondairy creamers    ¨ Nondairy whipped topping  Other guidelines:   · Use nondairy foods as substitutes for dairy foods in recipes  Replace 1 cup of whole milk with ½ cup soy or rice milk and ½ cup water  Replace regular yogurt or cheese with soy yogurt or cheese  · Your body needs calcium for strong bones and teeth and other important functions  Get the calcium you need from nondairy foods, such as lactose-free milk  It contains as much calcium as regular milk  Calcium is found in vegetables, such as turnip greens, collards, kale, and broccoli  It is also found in sardines, canned salmon, shellfish, almonds, and dried beans  Many foods and drinks have added calcium, such as tofu, orange juice, and soy milk  You can also take calcium as a supplement  Ask your dietitian for more information about how to get enough calcium  Contact your healthcare provider if:   · Your symptoms get worse  · You have questions or concerns about your condition or care  © 2017 [employer] Information is for End User's use only and may not be sold, redistributed or otherwise used for commercial purposes  All illustrations and images included in CareNotes® are the copyrighted property of [employer]  or [employer]i Larsen  The above information is an  only   It is not intended as medical advice for individual conditions or treatments  Talk to your doctor, nurse or pharmacist before following any medical regimen to see if it is safe and effective for you

## 2020-02-26 NOTE — PROGRESS NOTES
[facility] - Outpatient Consultation  Lincoln Davis 52 y o  female MRN: [id number]  Encounter: [id number]    ASSESSMENT AND PLAN:      1  Elevated liver enzymes  51-year-old female referred to us by Dr Zuri Valderrama for elevated LFTs  Patient was admitted to the hospital for some abdominal pain with only mild elevations of LFTs at that time  Neg EGD and CT A/P w IV Con  Outpatient blood work last week showed that the AST/ALT have now climb to 190/166  She is status post cholecystectomy  Unclear if this is part of autoimmune hepatitis given her chronic fatigue versus fatty liver  - Hepatic Function Test  - HCV FIBROSURE; Future  - Hepatitis B surface antigen; Future  - Hepatitis A antibody, total; Future  - Hepatitis B core antibody, IgM; Future  - Hepatitis B surface antibody; Future  - Hepatitis C antibody; Future  - SHAHANA Screen w/ Reflex to Titer/Pattern; Future  - Antimitochondrial antibody; Future  - Anti-smooth muscle antibody, IgG; Future  - Celiac Disease Antibody Profile; Future  - Ceruloplasmin; Future    - US abdomen complete; Future      2  Right upper quadrant abdominal pain  Vague abdominal wall pain near the surgical scar of her gastric bypass  Negative CT an EGD  - consider local injection with pain management    3  Obesity (BMI 35 0-39 9 without comorbidity)  Weight loss recommended    4  Colon cancer screening  Adopted so unknown family history  Had a colonoscopy many years ago at [facility], does not know the location  At this point, can started age 48      11  Anemia, unspecified type  Patient states that she has a history of anemia with IV iron infusions  She states that she is supposed to get another IV iron infusion next week although her hemoglobin has been normal through this whole time  She does have a history of bypass, and unclear if she gets iron infusions prophylactically  Regardless at this point no evidence of iron deficiency anemia  I advised that she wait for her next iron infusion  6  History of Murali-en-Y gastric bypass      Followup Appointment:  4-6 weeks  ______________________________________________________________________    Chief Complaint   Patient presents with   [facility] Follow up       HPI:   Pinky Cervantes is a 52y o  year old female who presents today at the request of Dr Mark Seay for elevated LFTs  Patient was admitted at the hospital earlier this month for abdominal pain  At that time EGD and CT were negative  The pain and the nausea have mostly subsided except for some localized tenderness over the scar  However, outpatient follow-up labs by her PCP last week showed elevated AST and ALT to 190 and 166 respectively  She has a history of cholecystectomy already  She has been complaining of lot of fatigue over the past several months  Denies any jaundice  No changes in bowels  Does have loose stools at baseline alternating with days of constipation  Denies bleeding  Weight is up  Appetite is fine      Historical Information   Past Medical History:   Diagnosis Date    Allergic     Depression     GERD (gastroesophageal reflux disease)     Hypertension     PONV (postoperative nausea and vomiting)     Tachycardia      Past Surgical History:   Procedure Laterality Date    ANTERIOR CRUCIATE LIGAMENT REPAIR      CHOLECYSTECTOMY      GASTRIC BYPASS      HYSTERECTOMY      INGUINAL HERNIA REPAIR      JOINT REPLACEMENT      KNEE ARTHROSCOPY Right     ORIF FINGER / THUMB FRACTURE Right     thumb surgery    DE LARYNGOSCOPY,DIRCT,OP SCOPE,BIOPSY N/A 7/12/2018    Procedure: LARYNGOSCOPY DIRECT;  Surgeon: Katherine Wren MD;  Location: [facility] OR;  Service: ENT    PYELOPLASTY      REPLACEMENT TOTAL KNEE Right     ROTATOR CUFF REPAIR       Social History     Substance and Sexual Activity   Alcohol Use Yes    Frequency: 2-3 times a week    Drinks per session: 1 or 2    Binge frequency: Never    Comment: Occasional     Social History     Substance and Sexual Activity   Drug Use No     Social History     Tobacco Use   Smoking Status Former Smoker    Packs/day: 1 00    Types: Cigarettes    Last attempt to quit: 2020    Years since quittin 0   Smokeless Tobacco Never Used     Family History   Adopted: Yes   Problem Relation Age of Onset    Diabetes Mother     Mental illness Mother         disorder    Hypertension Mother         benign    Substance Abuse Mother     Mental illness Father         disorder    Substance Abuse Father     Mental illness Family         disorder    Substance Abuse Family     Colon cancer Neg Hx     Colon polyps Neg Hx        Meds/Allergies     Current Outpatient Medications:     acetaminophen (TYLENOL) 325 mg tablet    acetaminophen-codeine (TYLENOL/CODEINE #3) 300-30 MG per tablet    DULoxetine (CYMBALTA) 60 mg delayed release capsule    gabapentin (NEURONTIN) 300 mg capsule    lisinopril (ZESTRIL) 20 mg tablet    metoprolol succinate (TOPROL-XL) 50 mg 24 hr tablet    montelukast (SINGULAIR) 10 mg tablet    nicotine (NICODERM CQ) 7 mg/24hr TD 24 hr patch    terbinafine (LamISIL) 250 mg tablet    Allergies   Allergen Reactions    Sulfamethoxazole-Trimethoprim Hives and Rash    Ferumoxytol Other (See Comments)     Caused Hypertension and hard time breathing    Sulfa Antibiotics Rash       PHYSICAL EXAM:    Blood pressure 116/86, pulse 72, height 5' 3" (1 6 m), weight 102 kg (225 lb)  Body mass index is 39 86 kg/m²  General Appearance: NAD, cooperative, alert  Eyes: Anicteric, PERRLA, EOMI  ENT:  Normocephalic, atraumatic, normal mucosa  Neck:  Supple, symmetrical, trachea midline,   Resp:  Clear to auscultation bilaterally; no rales, rhonchi or wheezing; respirations unlabored   CV:  S1 S2, Regular rate and rhythm; no murmur, rub, or gallop    GI:  Soft, non-tender, non-distended; normal bowel sounds; no masses, no organomegaly   Rectal: Deferred  Musculoskeletal: No cyanosis, clubbing or edema  Normal ROM  Skin:  No jaundice, rashes, or lesions   Heme/Lymph: No palpable cervical lymphadenopathy  Psych: Normal affect, good eye contact  Neuro: No gross deficits, AAOx3    Lab Results:   Lab Results   Component Value Date    WBC 7 43 02/18/2020    HGB 15 2 02/18/2020    HCT 47 2 (H) 02/18/2020    MCV 95 02/18/2020     02/18/2020     Lab Results   Component Value Date    K 5 2 02/18/2020     02/18/2020    CO2 28 02/18/2020    BUN 14 02/18/2020    CREATININE 0 86 02/18/2020    GLUF 105 (H) 02/18/2020    CALCIUM 9 6 02/18/2020     (H) 02/18/2020     (H) 02/18/2020    ALKPHOS 188 (H) 02/18/2020    EGFR 80 02/18/2020     Lab Results   Component Value Date    FERRITIN 33 02/18/2020     No results found for: LIPASE    Radiology Results:   Xr Chest Pa & Lateral    Result Date: 2/18/2020  Narrative: CHEST INDICATION:   R07 81: Pleurodynia R07 9: Chest pain, unspecified R06 02: Shortness of breath  COMPARISON:  None EXAM PERFORMED/VIEWS:  XR CHEST PA & LATERAL FINDINGS: Cardiomediastinal silhouette appears unremarkable  The lungs are clear  No pneumothorax or pleural effusion  Osseous structures appear within normal limits for patient age  There are right upper quadrant surgical clips from prior cholecystectomy  Impression: No acute cardiopulmonary disease  Workstation performed: [id number]     Ct Renal Stone Study Abdomen Pelvis Wo Contrast    Result Date: 2/11/2020  Narrative: CT ABDOMEN AND PELVIS WITHOUT IV CONTRAST - LOW DOSE RENAL STONE INDICATION:   Right flank pain    COMPARISON:  None  TECHNIQUE:  Low dose thin section CT examination of the abdomen and pelvis was performed without intravenous or oral contrast according to a protocol specifically designed to evaluate for urinary tract calculus  Axial, sagittal, and coronal 2D reformatted images were created from the source data and submitted for interpretation  Evaluation for pathology in the abdomen and pelvis that is unrelated to urinary tract calculi is limited  Radiation dose length product (DLP) for this visit:  570 mGy-cm   This examination, like all CT scans performed in the [facility], was performed utilizing techniques to minimize radiation dose exposure, including the use of iterative reconstruction and automated exposure control  FINDINGS: RIGHT KIDNEY AND URETER: Punctate 1 to 2 mm calculus in the upper pole the left kidney (series 601, image 106)  No hydronephrosis  LEFT KIDNEY AND URETER: No urinary tract calculi  No hydronephrosis or hydroureter  URINARY BLADDER: Unremarkable  No significant abnormality in the visualized lung bases  Mildly atrophic pancreas  Otherwise limited low radiation dose noncontrast CT evaluation demonstrates no clinically significant abnormality of liver, spleen, pancreas, or adrenal glands  The gallbladder is surgically absent  No ascites or bulky lymphadenopathy on this limited noncontrast study  Mild stranding within the central mesentery about small mesenteric lymph nodes  Status post gastric bypass  No bowel obstruction  The appendix is well seen and there is no evidence of acute appendicitis  Status post hysterectomy  No acute fracture or destructive osseous lesion is identified  Impression: 1  Punctate nonobstructive 1 to 2 mm calculus in the upper pole of the right kidney  No hydronephrosis  2   Mild stranding within the central mesentery about small mesenteric lymph nodes, nonspecific however may be seen in setting of sclerosing mesenteritis  Workstation performed: [id number]     Ct Abdomen Pelvis W Contrast    Result Date: 2/13/2020  Narrative: CT ABDOMEN AND PELVIS WITH IV CONTRAST INDICATION:   Abdominal pain, acute, nonlocalized  Diffuse abdominal pain, possible sclerosing mesenteritis seen on CT without contrast   Right flank pain  COMPARISON:  February 11, 2020   TECHNIQUE:  CT examination of the abdomen and pelvis was performed  Axial, sagittal, and coronal 2D reformatted images were created from the source data and submitted for interpretation  Radiation dose length product (DLP) for this visit:  1009 29 mGy-cm   This examination, like all CT scans performed in the [facility], was performed utilizing techniques to minimize radiation dose exposure, including the use of iterative reconstruction and automated exposure control  IV Contrast: 100 mL of iohexol (OMNIPAQUE) Enteric Contrast:  Enteric contrast was administered  FINDINGS: ABDOMEN LOWER CHEST:  Linear markings in the costophrenic angles most consistent with atelectasis or scarring  LIVER/BILIARY TREE:  There is prominence of common bile duct and central intrahepatic biliary tree most consistent with the sequela of prior cholecystectomy  Liver is otherwise unremarkable  GALLBLADDER:  Gallbladder is surgically absent  SPLEEN:  Unremarkable  PANCREAS:  Unremarkable  ADRENAL GLANDS:  Unremarkable  KIDNEYS/URETERS:  There is atypical appearance of the right kidney 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 and 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 pole moiety  Punctate calcific density at the upper pole the right kidney appears to represent small parenchymal calcification or perhaps a nonobstructing 1 to 2 mm intrarenal calculus  No left intrarenal calculi  No significant hydronephrosis  Symmetric renal nephrograms  STOMACH AND BOWEL:  There is surgical change of Murali-en-Y gastric bypass surgery  Surgical changes of prior hiatal hernia repair noted  There is a distended and air-filled viscus in the lower posterior mediastinum just above the gastric pouch, possibly  distended distal esophagus  Visualized small and large bowel loops appear unremarkable  Specifically, no bowel obstruction or other late complication of Murali-en-Y gastric bypass surgery    Unremarkable appearance of the large bowel  APPENDIX:  No findings to suggest appendicitis  ABDOMINOPELVIC CAVITY:  No ascites  No pneumoperitoneum  Normal sized lymph nodes within the small bowel mesentery and extremely subtle groundglass change, more apparent on the noncontrast examination, are of no clinical significance  A diagnosis of acute abdominal pain related to sclerosing mesentery this is not supported by the current CT appearance  More likely, the subtle groundglass change in the mesentery is likely due to slight mesenteric distortion related to prior Murali-en-Y gastric bypass surgery  VESSELS:  Unremarkable for patient's age  PELVIS REPRODUCTIVE ORGANS:  Surgical changes of prior hysterectomy  URINARY BLADDER:  Unremarkable  ABDOMINAL WALL/INGUINAL REGIONS:  Unremarkable  OSSEOUS STRUCTURES:  No acute fracture or destructive osseous lesion  Impression: No acute CT abnormality in the abdomen or pelvis to definitively account for the patient's abdominal pain  Normal sized lymph nodes within the small bowel mesentery and extremely subtle groundglass change, more apparent on the noncontrast examination, are of no clinical significance  Specifically, a diagnosis of acute abdominal pain related to sclerosing mesentery this is not supported by the current CT appearance  More likely, the subtle groundglass change in the mesentery is likely due to slight mesenteric distortion related to prior Murali-en-Y gastric bypass surgery  Surgical changes of prior hiatal hernia repair and Murali-en-Y gastric bypass surgery  There is distention of air filled structure in the lower posterior mediastinum just above the level of the gastric pouch which probably represents distended air-filled esophagus   Atypical morphology of the right kidney with an appearance most suspicious for duplication of renal collecting system and mild congenital UPJ obstruction of the lower pole moiety but of unlikely clinical significance given the absence of significant hydronephrosis, uniform bilateral renal nephrograms and no evidence of renal scarring  Workstation performed: [id number]       REVIEW OF SYSTEMS:    CONSTITUTIONAL: Denies any fever, chills, rigors, and weight loss  HEENT: No earache or tinnitus  Denies hearing loss or visual disturbances  CARDIOVASCULAR: No chest pain or palpitations  RESPIRATORY: Denies any cough, hemoptysis, shortness of breath or dyspnea on exertion  GASTROINTESTINAL: As noted in the History of Present Illness  GENITOURINARY: No problems with urination  Denies any hematuria or dysuria  NEUROLOGIC: No dizziness or vertigo, denies headaches  MUSCULOSKELETAL: Denies any muscle or joint pain  SKIN: Denies skin rashes or itching  ENDOCRINE: Denies excessive thirst  Denies intolerance to heat or cold  PSYCHOSOCIAL: Denies depression or anxiety  Denies any recent memory loss

## 2020-02-27 ENCOUNTER — HOSPITAL ENCOUNTER (OUTPATIENT)
Dept: ULTRASOUND IMAGING | Facility: HOSPITAL | Age: 50
Discharge: HOME | End: 2020-02-27
Attending: INTERNAL MEDICINE
Payer: COMMERCIAL

## 2020-02-27 DIAGNOSIS — R10.11 RIGHT UPPER QUADRANT ABDOMINAL PAIN: ICD-10-CM

## 2020-02-27 DIAGNOSIS — R74.8 ELEVATED LIVER ENZYMES: ICD-10-CM

## 2020-02-27 LAB
ACTIN IGG SERPL-ACNC: 5 UNITS (ref 0–19)
CERULOPLASMIN SERPL-MCNC: 34.6 MG/DL (ref 19–39)
MITOCHONDRIA M2 IGG SER-ACNC: <20 UNITS (ref 0–20)

## 2020-02-27 PROCEDURE — 76705 ECHO EXAM OF ABDOMEN: CPT

## 2020-02-28 LAB
A2 MACROGLOB SERPL-MCNC: 159 MG/DL (ref 110–276)
ALT SERPL W P-5'-P-CCNC: 38 IU/L (ref 0–40)
APO A-I SERPL-MCNC: 149 MG/DL (ref 116–209)
BILIRUB SERPL-MCNC: 0.2 MG/DL (ref 0–1.2)
COMMENT: ABNORMAL
ENDOMYSIUM IGA SER QL: NEGATIVE
FIBROSIS SCORING:: ABNORMAL
FIBROSIS STAGE SERPL QL: ABNORMAL
GGT SERPL-CCNC: 69 IU/L (ref 0–60)
GLIADIN PEPTIDE IGA SER-ACNC: 3 UNITS (ref 0–19)
GLIADIN PEPTIDE IGG SER-ACNC: 2 UNITS (ref 0–19)
HAPTOGLOB SERPL-MCNC: 215 MG/DL (ref 42–296)
IGA SERPL-MCNC: 244 MG/DL (ref 87–352)
INTERPRETATIONS: ABNORMAL
LIVER FIBR SCORE SERPL CALC.FIBROSURE: 0.07 (ref 0–0.21)
NECROINFLAMM ACTIVITY SCORING:: ABNORMAL
NECROINFLAMMATORY ACT GRADE SERPL QL: ABNORMAL
NECROINFLAMMATORY ACT SCORE SERPL: 0.15 (ref 0–0.17)
RYE IGE QN: NEGATIVE
SERVICE CMNT-IMP: ABNORMAL
TTG IGA SER-ACNC: <2 U/ML (ref 0–3)
TTG IGG SER-ACNC: <2 U/ML (ref 0–5)

## 2020-03-03 ENCOUNTER — TELEPHONE (OUTPATIENT)
Dept: GASTROENTEROLOGY | Facility: CLINIC | Age: 50
End: 2020-03-03

## 2020-03-03 NOTE — TELEPHONE ENCOUNTER
Pt left [facility] stating she had an US at [facility]; asks for results # [phone]

## 2020-03-03 NOTE — TELEPHONE ENCOUNTER
I called patient back reviewed US results, fatty liver  Recommendations would be weight loss, including excerise & diet changes  She can discuss further with Dr Idris Lu at her follow up visit 3/16/2020

## 2020-03-06 DIAGNOSIS — I10 ESSENTIAL HYPERTENSION: ICD-10-CM

## 2020-03-06 DIAGNOSIS — K21.9 GASTROESOPHAGEAL REFLUX DISEASE WITHOUT ESOPHAGITIS: ICD-10-CM

## 2020-03-06 RX ORDER — PANTOPRAZOLE SODIUM 40 MG/1
40 TABLET, DELAYED RELEASE ORAL
Qty: 30 TABLET | Refills: 1 | OUTPATIENT
Start: 2020-03-06

## 2020-03-06 RX ORDER — LISINOPRIL 20 MG/1
TABLET ORAL
Qty: 30 TABLET | Refills: 11 | Status: SHIPPED | OUTPATIENT
Start: 2020-03-06 | End: 2020-04-27 | Stop reason: SDUPTHER

## 2020-03-13 DIAGNOSIS — K21.9 GASTROESOPHAGEAL REFLUX DISEASE WITHOUT ESOPHAGITIS: Primary | ICD-10-CM

## 2020-03-16 ENCOUNTER — OFFICE VISIT (OUTPATIENT)
Dept: GASTROENTEROLOGY | Facility: CLINIC | Age: 50
End: 2020-03-16
Payer: COMMERCIAL

## 2020-03-16 VITALS
HEART RATE: 70 BPM | BODY MASS INDEX: 38.98 KG/M2 | HEIGHT: 63 IN | DIASTOLIC BLOOD PRESSURE: 90 MMHG | SYSTOLIC BLOOD PRESSURE: 128 MMHG | WEIGHT: 220 LBS

## 2020-03-16 DIAGNOSIS — K76.0 FATTY LIVER: ICD-10-CM

## 2020-03-16 DIAGNOSIS — Z12.11 COLON CANCER SCREENING: ICD-10-CM

## 2020-03-16 DIAGNOSIS — R79.89 ELEVATED LFTS: Primary | ICD-10-CM

## 2020-03-16 DIAGNOSIS — Z98.84 HISTORY OF ROUX-EN-Y GASTRIC BYPASS: ICD-10-CM

## 2020-03-16 DIAGNOSIS — D64.9 ANEMIA, UNSPECIFIED TYPE: ICD-10-CM

## 2020-03-16 DIAGNOSIS — E66.9 OBESITY (BMI 35.0-39.9 WITHOUT COMORBIDITY): Chronic | ICD-10-CM

## 2020-03-16 PROCEDURE — 3080F DIAST BP >= 90 MM HG: CPT | Performed by: INTERNAL MEDICINE

## 2020-03-16 PROCEDURE — 3008F BODY MASS INDEX DOCD: CPT | Performed by: INTERNAL MEDICINE

## 2020-03-16 PROCEDURE — 99214 OFFICE O/P EST MOD 30 MIN: CPT | Performed by: INTERNAL MEDICINE

## 2020-03-16 PROCEDURE — 3074F SYST BP LT 130 MM HG: CPT | Performed by: INTERNAL MEDICINE

## 2020-03-16 PROCEDURE — 1036F TOBACCO NON-USER: CPT | Performed by: INTERNAL MEDICINE

## 2020-03-16 NOTE — LETTER
March 16, 2020     MD Ramona Urban  [address] [address]    Patient: Jaqueline Forman   YOB: 1970   Date of Visit: 3/16/2020       Dear Dr Luc Rankin: Thank you for referring Bartolome Chapin to me for evaluation  Below are my notes for this consultation  If you have questions, please do not hesitate to call me  I look forward to following your patient along with you  Sincerely,        Kisha Contreras MD        CC: No Recipients  Kisha Contreras MD  3/16/2020 12:06 PM  Sign at close encounter  [facility] - Outpatient Follow-up Note  Jaqueline Forman 52 y o  female MRN: [id number]  Encounter: [id number]    ASSESSMENT AND PLAN:      1  Elevated LFTs  42-year-old female here today for follow-up  Abdominal pain improved  LFTs have now normalized  Sounds like it was an acute viral illness  We did proceed with a full workup, workup was negative for autoimmune, metabolic, viral etiologies  Ultrasound did show fatty liver, F 0 on fibrosis score  - weight loss recommended    2  Obesity (BMI 35 0-39 9 without comorbidity)    3  Anemia, unspecified type  Status post IV iron, history of gastric bypass  Will follow CBCs every 6 months  4  History of Murali-en-Y gastric bypass    5  Colon cancer screening  Average risk, can started age 48 which is this May  We will schedule colonoscopy today  - Schedule Colonoscopy @ [address] [place] [address] after May  Followup Appointment:  1 year  ______________________________________________________________________    Chief Complaint   Patient presents with    Follow-up     elevated liver enzymes     HPI:  42-year-old female here today for follow-up  Doing better  No further abdominal pain and bowel habits are back to normal   Repeat LFTs have normalized  No GI bleeding  Otherwise doing well      Historical Information   Past Medical History:   Diagnosis Date    Allergic     Depression     GERD (gastroesophageal reflux disease)     Hypertension     PONV (postoperative nausea and vomiting)     Tachycardia      Past Surgical History:   Procedure Laterality Date    ANTERIOR CRUCIATE LIGAMENT REPAIR      CHOLECYSTECTOMY      GASTRIC BYPASS      HYSTERECTOMY      INGUINAL HERNIA REPAIR      JOINT REPLACEMENT      KNEE ARTHROSCOPY Right     ORIF FINGER / THUMB FRACTURE Right     thumb surgery    RI LARYNGOSCOPY,DIRCT,OP SCOPE,BIOPSY N/A 2018    Procedure: LARYNGOSCOPY DIRECT;  Surgeon: Anahi Holcomb MD;  Location:  MAIN OR;  Service: ENT    PYELOPLASTY      REPLACEMENT TOTAL KNEE Right     ROTATOR CUFF REPAIR       Social History     Substance and Sexual Activity   Alcohol Use Yes    Frequency: 2-3 times a week    Drinks per session: 1 or 2    Binge frequency: Never    Comment: Occasional     Social History     Substance and Sexual Activity   Drug Use No     Social History     Tobacco Use   Smoking Status Former Smoker    Packs/day: 1 00    Types: Cigarettes    Last attempt to quit: 2020    Years since quittin 1   Smokeless Tobacco Never Used     Family History   Adopted: Yes   Problem Relation Age of Onset    Diabetes Mother     Mental illness Mother         disorder    Hypertension Mother         benign    Substance Abuse Mother     Mental illness Father         disorder    Substance Abuse Father     Mental illness Family         disorder    Substance Abuse Family     Colon cancer Neg Hx     Colon polyps Neg Hx          Current Outpatient Medications:     acetaminophen (TYLENOL) 325 mg tablet    acetaminophen-codeine (TYLENOL/CODEINE #3) 300-30 MG per tablet    DULoxetine (CYMBALTA) 60 mg delayed release capsule    gabapentin (NEURONTIN) 300 mg capsule    lisinopril (ZESTRIL) 20 mg tablet    metoprolol succinate (TOPROL-XL) 50 mg 24 hr tablet    montelukast (SINGULAIR) 10 mg tablet    nicotine (NICODERM CQ) 7 mg/24hr TD 24 hr patch   terbinafine (LamISIL) 250 mg tablet  Allergies   Allergen Reactions    Sulfamethoxazole-Trimethoprim Hives and Rash    Ferumoxytol Other (See Comments)     Caused Hypertension and hard time breathing    Sulfa Antibiotics Rash     Reviewed medications and allergies and updated as indicated    PHYSICAL EXAM:    Blood pressure 128/90, pulse 70, height 5' 3" (1 6 m), weight 99 8 kg (220 lb)  Body mass index is 38 97 kg/m²  General Appearance: NAD, cooperative, alert  Eyes: Anicteric, PERRLA, EOMI  ENT:  Normocephalic, atraumatic, normal mucosa  Neck:  Supple, symmetrical, trachea midline  Resp:  Clear to auscultation bilaterally; no rales, rhonchi or wheezing; respirations unlabored   CV:  S1 S2, Regular rate and rhythm; no murmur, rub, or gallop  GI:  Soft, non-tender, non-distended; normal bowel sounds; no masses, no organomegaly   Rectal: Deferred  Musculoskeletal: No cyanosis, clubbing or edema  Normal ROM  Skin:  No jaundice, rashes, or lesions   Heme/Lymph: No palpable cervical lymphadenopathy  Psych: Normal affect, good eye contact  Neuro: No gross deficits, AAOx3    Lab Results:   Lab Results   Component Value Date    WBC 7 43 02/18/2020    HGB 15 2 02/18/2020    HCT 47 2 (H) 02/18/2020    MCV 95 02/18/2020     02/18/2020     Lab Results   Component Value Date    K 5 2 02/18/2020     02/18/2020    CO2 28 02/18/2020    BUN 14 02/18/2020    CREATININE 0 86 02/18/2020    GLUF 105 (H) 02/18/2020    CALCIUM 9 6 02/18/2020    AST 21 02/26/2020    ALT 38 02/26/2020    ALT 41 02/26/2020    ALKPHOS 177 (H) 02/26/2020    EGFR 80 02/18/2020     Lab Results   Component Value Date    FERRITIN 33 02/18/2020     No results found for: LIPASE    Radiology Results:   Xr Chest Pa & Lateral    Result Date: 2/18/2020  Narrative: CHEST INDICATION:   R07 81: Pleurodynia R07 9: Chest pain, unspecified R06 02: Shortness of breath   COMPARISON:  None EXAM PERFORMED/VIEWS:  XR CHEST PA & LATERAL FINDINGS: Cardiomediastinal silhouette appears unremarkable  The lungs are clear  No pneumothorax or pleural effusion  Osseous structures appear within normal limits for patient age  There are right upper quadrant surgical clips from prior cholecystectomy  Impression: No acute cardiopulmonary disease  Workstation performed: [id number]     Us Right Upper Quadrant    Result Date: 3/3/2020  Narrative: RIGHT UPPER QUADRANT ULTRASOUND INDICATION:     R74 8: Abnormal levels of other serum enzymes R10 11: Right upper quadrant pain  COMPARISON:  CT abdomen and pelvis dated 2/12/2020  TECHNIQUE:   Real-time ultrasound of the right upper quadrant was performed with a curvilinear transducer with both volumetric sweeps and still imaging techniques  FINDINGS: PANCREAS:  Obscured by bowel gas  AORTA AND IVC:  Visualized portions are normal for patient age  LIVER: Size:  Mildly enlarged  The liver measures 16 3 cm in the midclavicular line  Contour:  Surface contour is smooth  Parenchyma:  Echogenicity and echotexture are within normal limits  No evidence of suspicious mass  Limited imaging of the main portal vein shows it to be patent and hepatopetal   BILIARY: Patient has undergone cholecystectomy  No intrahepatic biliary dilatation  CBD measures 11 mm, likely on the basis of postcholecystectomy state  No choledocholithiasis  KIDNEY: Right kidney measures 11 4 x 4 3 cm  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

## 2020-03-16 NOTE — PROGRESS NOTES
[id number] - Outpatient Follow-up Note  Nate Gutierrez 52 y o  female MRN: [id number]  Encounter: [id number]    ASSESSMENT AND PLAN:      1  Elevated LFTs  49-year-old female here today for follow-up  Abdominal pain improved  LFTs have now normalized  Sounds like it was an acute viral illness  We did proceed with a full workup, workup was negative for autoimmune, metabolic, viral etiologies  Ultrasound did show fatty liver, F 0 on fibrosis score  - weight loss recommended    2  Obesity (BMI 35 0-39 9 without comorbidity)    3  Anemia, unspecified type  Status post IV iron, history of gastric bypass  Will follow CBCs every 6 months  4  History of Murali-en-Y gastric bypass    5  Colon cancer screening  Average risk, can started age 48 which is this May  We will schedule colonoscopy today  - Schedule Colonoscopy @ [address] [place] [address] after May  Followup Appointment:  1 year  ______________________________________________________________________    Chief Complaint   Patient presents with    Follow-up     elevated liver enzymes     HPI:  49-year-old female here today for follow-up  Doing better  No further abdominal pain and bowel habits are back to normal   Repeat LFTs have normalized  No GI bleeding  Otherwise doing well      Historical Information   Past Medical History:   Diagnosis Date    Allergic     Depression     GERD (gastroesophageal reflux disease)     Hypertension     PONV (postoperative nausea and vomiting)     Tachycardia      Past Surgical History:   Procedure Laterality Date    ANTERIOR CRUCIATE LIGAMENT REPAIR      CHOLECYSTECTOMY      GASTRIC BYPASS      HYSTERECTOMY      INGUINAL HERNIA REPAIR      JOINT REPLACEMENT      KNEE ARTHROSCOPY Right     ORIF FINGER / THUMB FRACTURE Right     thumb surgery    MS LARYNGOSCOPY,DIRCT,OP SCOPE,BIOPSY N/A 7/12/2018    Procedure: LARYNGOSCOPY DIRECT;  Surgeon: Genna Goledn MD; Location:  MAIN OR;  Service: ENT    PYELOPLASTY      REPLACEMENT TOTAL KNEE Right     ROTATOR CUFF REPAIR       Social History     Substance and Sexual Activity   Alcohol Use Yes    Frequency: 2-3 times a week    Drinks per session: 1 or 2    Binge frequency: Never    Comment: Occasional     Social History     Substance and Sexual Activity   Drug Use No     Social History     Tobacco Use   Smoking Status Former Smoker    Packs/day: 1 00    Types: Cigarettes    Last attempt to quit: 2020    Years since quittin 1   Smokeless Tobacco Never Used     Family History   Adopted: Yes   Problem Relation Age of Onset    Diabetes Mother     Mental illness Mother         disorder    Hypertension Mother         benign    Substance Abuse Mother     Mental illness Father         disorder    Substance Abuse Father     Mental illness Family         disorder    Substance Abuse Family     Colon cancer Neg Hx     Colon polyps Neg Hx          Current Outpatient Medications:     acetaminophen (TYLENOL) 325 mg tablet    acetaminophen-codeine (TYLENOL/CODEINE #3) 300-30 MG per tablet    DULoxetine (CYMBALTA) 60 mg delayed release capsule    gabapentin (NEURONTIN) 300 mg capsule    lisinopril (ZESTRIL) 20 mg tablet    metoprolol succinate (TOPROL-XL) 50 mg 24 hr tablet    montelukast (SINGULAIR) 10 mg tablet    nicotine (NICODERM CQ) 7 mg/24hr TD 24 hr patch    terbinafine (LamISIL) 250 mg tablet  Allergies   Allergen Reactions    Sulfamethoxazole-Trimethoprim Hives and Rash    Ferumoxytol Other (See Comments)     Caused Hypertension and hard time breathing    Sulfa Antibiotics Rash     Reviewed medications and allergies and updated as indicated    PHYSICAL EXAM:    Blood pressure 128/90, pulse 70, height 5' 3" (1 6 m), weight 99 8 kg (220 lb)  Body mass index is 38 97 kg/m²  General Appearance: NAD, cooperative, alert  Eyes: Anicteric, PERRLA, EOMI  ENT:  Normocephalic, atraumatic, normal mucosa  Neck:  Supple, symmetrical, trachea midline  Resp:  Clear to auscultation bilaterally; no rales, rhonchi or wheezing; respirations unlabored   CV:  S1 S2, Regular rate and rhythm; no murmur, rub, or gallop  GI:  Soft, non-tender, non-distended; normal bowel sounds; no masses, no organomegaly   Rectal: Deferred  Musculoskeletal: No cyanosis, clubbing or edema  Normal ROM  Skin:  No jaundice, rashes, or lesions   Heme/Lymph: No palpable cervical lymphadenopathy  Psych: Normal affect, good eye contact  Neuro: No gross deficits, AAOx3    Lab Results:   Lab Results   Component Value Date    WBC 7 43 02/18/2020    HGB 15 2 02/18/2020    HCT 47 2 (H) 02/18/2020    MCV 95 02/18/2020     02/18/2020     Lab Results   Component Value Date    K 5 2 02/18/2020     02/18/2020    CO2 28 02/18/2020    BUN 14 02/18/2020    CREATININE 0 86 02/18/2020    GLUF 105 (H) 02/18/2020    CALCIUM 9 6 02/18/2020    AST 21 02/26/2020    ALT 38 02/26/2020    ALT 41 02/26/2020    ALKPHOS 177 (H) 02/26/2020    EGFR 80 02/18/2020     Lab Results   Component Value Date    FERRITIN 33 02/18/2020     No results found for: LIPASE    Radiology Results:   Xr Chest Pa & Lateral    Result Date: 2/18/2020  Narrative: CHEST INDICATION:   R07 81: Pleurodynia R07 9: Chest pain, unspecified R06 02: Shortness of breath  COMPARISON:  None EXAM PERFORMED/VIEWS:  XR CHEST PA & LATERAL FINDINGS: Cardiomediastinal silhouette appears unremarkable  The lungs are clear  No pneumothorax or pleural effusion  Osseous structures appear within normal limits for patient age  There are right upper quadrant surgical clips from prior cholecystectomy  Impression: No acute cardiopulmonary disease  Workstation performed: [id number]     Us Right Upper Quadrant    Result Date: 3/3/2020  Narrative: RIGHT UPPER QUADRANT ULTRASOUND INDICATION:     R74 8: Abnormal levels of other serum enzymes R10 11: Right upper quadrant pain   COMPARISON:  CT abdomen and pelvis dated 2/12/2020  TECHNIQUE:   Real-time ultrasound of the right upper quadrant was performed with a curvilinear transducer with both volumetric sweeps and still imaging techniques  FINDINGS: PANCREAS:  Obscured by bowel gas  AORTA AND IVC:  Visualized portions are normal for patient age  LIVER: Size:  Mildly enlarged  The liver measures 16 3 cm in the midclavicular line  Contour:  Surface contour is smooth  Parenchyma:  Echogenicity and echotexture are within normal limits  No evidence of suspicious mass  Limited imaging of the main portal vein shows it to be patent and hepatopetal   BILIARY: Patient has undergone cholecystectomy  No intrahepatic biliary dilatation  CBD measures 11 mm, likely on the basis of postcholecystectomy state  No choledocholithiasis  KIDNEY: Right kidney measures 11 4 x 4 3 cm  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

## 2020-04-27 DIAGNOSIS — Z00.00 HEALTH CARE MAINTENANCE: ICD-10-CM

## 2020-04-27 DIAGNOSIS — Z88.9 H/O MULTIPLE ALLERGIES: ICD-10-CM

## 2020-04-27 DIAGNOSIS — I10 ESSENTIAL HYPERTENSION: ICD-10-CM

## 2020-04-27 RX ORDER — LISINOPRIL 20 MG/1
20 TABLET ORAL DAILY
Qty: 90 TABLET | Refills: 1 | Status: SHIPPED | OUTPATIENT
Start: 2020-04-27 | End: 2020-11-17 | Stop reason: SDUPTHER

## 2020-04-27 RX ORDER — METOPROLOL SUCCINATE 50 MG/1
50 TABLET, EXTENDED RELEASE ORAL DAILY
Qty: 90 TABLET | Refills: 1 | Status: SHIPPED | OUTPATIENT
Start: 2020-04-27 | End: 2021-01-11 | Stop reason: SDUPTHER

## 2020-04-27 RX ORDER — MONTELUKAST SODIUM 10 MG/1
10 TABLET ORAL DAILY
Qty: 90 TABLET | Refills: 1 | Status: SHIPPED | OUTPATIENT
Start: 2020-04-27 | End: 2021-02-23 | Stop reason: SDUPTHER

## 2020-05-29 ENCOUNTER — CLINICAL SUPPORT (OUTPATIENT)
Dept: GASTROENTEROLOGY | Facility: CLINIC | Age: 50
End: 2020-05-29

## 2020-05-29 ENCOUNTER — TELEPHONE (OUTPATIENT)
Dept: GASTROENTEROLOGY | Facility: CLINIC | Age: 50
End: 2020-05-29

## 2020-05-29 VITALS — HEIGHT: 63 IN | WEIGHT: 220 LBS | BODY MASS INDEX: 38.98 KG/M2

## 2020-05-29 DIAGNOSIS — Z11.59 SCREENING FOR VIRAL DISEASE: ICD-10-CM

## 2020-05-29 DIAGNOSIS — Z12.11 ENCOUNTER FOR SCREENING COLONOSCOPY: Primary | ICD-10-CM

## 2020-05-29 PROCEDURE — U0003 INFECTIOUS AGENT DETECTION BY NUCLEIC ACID (DNA OR RNA); SEVERE ACUTE RESPIRATORY SYNDROME CORONAVIRUS 2 (SARS-COV-2) (CORONAVIRUS DISEASE [COVID-19]), AMPLIFIED PROBE TECHNIQUE, MAKING USE OF HIGH THROUGHPUT TECHNOLOGIES AS DESCRIBED BY CMS-2020-01-R: HCPCS

## 2020-06-01 LAB — SARS-COV-2 RNA SPEC QL NAA+PROBE: NOT DETECTED

## 2020-06-05 ENCOUNTER — ANESTHESIA (OUTPATIENT)
Dept: GASTROENTEROLOGY | Facility: AMBULATORY SURGERY CENTER | Age: 50
End: 2020-06-05

## 2020-06-05 ENCOUNTER — HOSPITAL ENCOUNTER (OUTPATIENT)
Dept: GASTROENTEROLOGY | Facility: AMBULATORY SURGERY CENTER | Age: 50
Discharge: HOME | End: 2020-06-05
Payer: COMMERCIAL

## 2020-06-05 ENCOUNTER — ANESTHESIA EVENT (OUTPATIENT)
Dept: GASTROENTEROLOGY | Facility: AMBULATORY SURGERY CENTER | Age: 50
End: 2020-06-05

## 2020-06-05 VITALS
HEART RATE: 74 BPM | SYSTOLIC BLOOD PRESSURE: 127 MMHG | RESPIRATION RATE: 20 BRPM | OXYGEN SATURATION: 99 % | TEMPERATURE: 98.4 F | DIASTOLIC BLOOD PRESSURE: 73 MMHG

## 2020-06-05 DIAGNOSIS — R10.11 RIGHT UPPER QUADRANT ABDOMINAL PAIN: ICD-10-CM

## 2020-06-05 DIAGNOSIS — D64.9 ANEMIA, UNSPECIFIED TYPE: ICD-10-CM

## 2020-06-05 PROCEDURE — G0121 COLON CA SCRN NOT HI RSK IND: HCPCS | Performed by: INTERNAL MEDICINE

## 2020-06-05 RX ORDER — SODIUM CHLORIDE 9 MG/ML
50 INJECTION, SOLUTION INTRAVENOUS CONTINUOUS
Status: DISCONTINUED | OUTPATIENT
Start: 2020-06-05 | End: 2020-06-09 | Stop reason: HOSPADM

## 2020-06-05 RX ORDER — PROPOFOL 10 MG/ML
INJECTION, EMULSION INTRAVENOUS AS NEEDED
Status: DISCONTINUED | OUTPATIENT
Start: 2020-06-05 | End: 2020-06-05 | Stop reason: SURG

## 2020-06-05 RX ADMIN — PROPOFOL 80 MG: 10 INJECTION, EMULSION INTRAVENOUS at 09:36

## 2020-06-05 RX ADMIN — SODIUM CHLORIDE 50 ML/HR: 9 INJECTION, SOLUTION INTRAVENOUS at 09:31

## 2020-06-05 RX ADMIN — PROPOFOL 120 MG: 10 INJECTION, EMULSION INTRAVENOUS at 09:32

## 2020-06-05 RX ADMIN — PROPOFOL 50 MG: 10 INJECTION, EMULSION INTRAVENOUS at 09:49

## 2020-06-05 RX ADMIN — PROPOFOL 40 MG: 10 INJECTION, EMULSION INTRAVENOUS at 09:41

## 2020-06-05 RX ADMIN — PROPOFOL 50 MG: 10 INJECTION, EMULSION INTRAVENOUS at 09:45

## 2020-06-05 RX ADMIN — PROPOFOL 40 MG: 10 INJECTION, EMULSION INTRAVENOUS at 09:39

## 2020-06-08 ENCOUNTER — TELEPHONE (OUTPATIENT)
Dept: GASTROENTEROLOGY | Facility: CLINIC | Age: 50
End: 2020-06-08

## 2020-07-21 ENCOUNTER — OFFICE VISIT (OUTPATIENT)
Dept: GASTROENTEROLOGY | Facility: CLINIC | Age: 50
End: 2020-07-21
Payer: COMMERCIAL

## 2020-07-21 VITALS
DIASTOLIC BLOOD PRESSURE: 78 MMHG | HEIGHT: 63 IN | WEIGHT: 220 LBS | SYSTOLIC BLOOD PRESSURE: 120 MMHG | TEMPERATURE: 96.6 F | BODY MASS INDEX: 38.98 KG/M2

## 2020-07-21 DIAGNOSIS — K64.8 INTERNAL HEMORRHOIDS: Primary | ICD-10-CM

## 2020-07-21 PROCEDURE — 46221 LIGATION OF HEMORRHOID(S): CPT | Performed by: INTERNAL MEDICINE

## 2020-07-21 RX ORDER — CELECOXIB 200 MG/1
200 CAPSULE ORAL 2 TIMES DAILY
COMMUNITY
End: 2021-02-23 | Stop reason: SDUPTHER

## 2020-07-21 NOTE — PROGRESS NOTES
[id number] - Hemorrhoid Banding Ifeanyi Tovar 48 y o  female MRN: [id number]  Encounter: [id number]    ASSESSMENT AND PLAN:    The right posterior hemorrhoid area was banded today  The patient was instructed to avoid constipation and straining, and educated in appropriate fiber intake  HPI: Ifeanyi Tovar is a 48y o  year old female who presents with bleeding and itching due to hemorrhoids       Previous treatments include: preparation H cream  Bands were previously placed: none    Current Fiber intake: dietart  Complications of prior therapy include: n/a    The patient provided consent for banding  and was placed in the left lateral position  Rectal exam showed internal hemorrhoids   The O'Elkin ligator was advanced and a band was applied without difficulty   Repeat rectal exam confirmed appropriate placement  The patient was discharged home after observation in the waiting area  The exam was chaperoned by Netta Pacheco, [address] [place]

## 2020-08-05 ENCOUNTER — OFFICE VISIT (OUTPATIENT)
Dept: GASTROENTEROLOGY | Facility: CLINIC | Age: 50
End: 2020-08-05
Payer: COMMERCIAL

## 2020-08-05 VITALS
WEIGHT: 220 LBS | TEMPERATURE: 97.7 F | DIASTOLIC BLOOD PRESSURE: 84 MMHG | BODY MASS INDEX: 38.98 KG/M2 | SYSTOLIC BLOOD PRESSURE: 120 MMHG | HEIGHT: 63 IN

## 2020-08-05 DIAGNOSIS — K62.5 RECTAL BLEEDING: Primary | ICD-10-CM

## 2020-08-05 PROCEDURE — 46221 LIGATION OF HEMORRHOID(S): CPT | Performed by: INTERNAL MEDICINE

## 2020-08-05 NOTE — PROGRESS NOTES
[id number] - Hemorrhoid Banding Afshan Andres 48 y o  female MRN: [id number]  Encounter: [id number]    ASSESSMENT AND PLAN:    The left lateral hemorrhoid area was banded today  The patient was instructed to avoid constipation and straining, and educated in appropriate fiber intake  HPI: Afshan Andres is a 48y o  year old female who presents with bleeding and itching due to hemorrhoids    She had some improvement of bleeding with the 1st band, itching persists    Previous treatments include:  OTC meds, 1 band  Bands were previously placed:  Right posterior   Current Fiber intake:  Dietary  Complications of prior therapy include:  None    The patient provided consent for banding  and was placed in the left lateral position  Rectal exam showed small internal hemorrhoids  The O'Elkin ligator was advanced and a band was applied without difficulty   Repeat rectal exam confirmed appropriate placement  The patient was discharged home after observation in the waiting area  The exam was chaperoned by medical assistant

## 2020-08-17 ENCOUNTER — HOSPITAL ENCOUNTER (OUTPATIENT)
Dept: MAMMOGRAPHY | Facility: CLINIC | Age: 50
Discharge: HOME | End: 2020-08-17
Payer: COMMERCIAL

## 2020-08-17 VITALS — WEIGHT: 220 LBS | HEIGHT: 63 IN | TEMPERATURE: 97.8 F | BODY MASS INDEX: 38.98 KG/M2

## 2020-08-17 DIAGNOSIS — Z12.39 SCREENING BREAST EXAMINATION: ICD-10-CM

## 2020-08-17 PROCEDURE — 77067 SCR MAMMO BI INCL CAD: CPT

## 2020-08-17 PROCEDURE — 77063 BREAST TOMOSYNTHESIS BI: CPT

## 2020-08-20 ENCOUNTER — OFFICE VISIT (OUTPATIENT)
Dept: GASTROENTEROLOGY | Facility: CLINIC | Age: 50
End: 2020-08-20
Payer: COMMERCIAL

## 2020-08-20 VITALS
HEIGHT: 63 IN | TEMPERATURE: 97.7 F | BODY MASS INDEX: 38.98 KG/M2 | WEIGHT: 220 LBS | DIASTOLIC BLOOD PRESSURE: 84 MMHG | HEART RATE: 76 BPM | SYSTOLIC BLOOD PRESSURE: 134 MMHG

## 2020-08-20 DIAGNOSIS — K64.8 INTERNAL HEMORRHOIDS WITH COMPLICATION: ICD-10-CM

## 2020-08-20 PROCEDURE — 46221 LIGATION OF HEMORRHOID(S): CPT | Performed by: INTERNAL MEDICINE

## 2020-08-20 PROCEDURE — 3008F BODY MASS INDEX DOCD: CPT | Performed by: INTERNAL MEDICINE

## 2020-08-20 NOTE — PROGRESS NOTES
[id number] - Hemorrhoid Banding Do Bird 48 y o  female MRN: [id number]  Encounter: [id number]    ASSESSMENT AND PLAN:    The right anterior hemorrhoid area was banded today  The patient was instructed to avoid constipation and straining, and educated in appropriate fiber intake  HPI: Do Bird is a 48y o  year old female who presents with bleeding and itching due to hemorrhoids    She has no further bleeding, itching has improved significantly    Previous treatments include:  OTC meds  Bands were previously placed:  Right posterior, left lateral   Current Fiber intake:  Dietary  Complications of prior therapy include:  None    The patient provided consent for banding  and was placed in the left lateral position  Rectal exam showed small internal hemorrhoids  The O'Elkin ligator was advanced and a band was applied without difficulty   Repeat rectal exam confirmed appropriate placement  The patient was discharged home after observation in the waiting area  The exam was chaperoned by Silas Dykes

## 2020-11-17 ENCOUNTER — OFFICE VISIT (OUTPATIENT)
Dept: FAMILY MEDICINE CLINIC | Facility: HOSPITAL | Age: 50
End: 2020-11-17
Payer: COMMERCIAL

## 2020-11-17 VITALS
WEIGHT: 220.2 LBS | TEMPERATURE: 97 F | HEIGHT: 63 IN | DIASTOLIC BLOOD PRESSURE: 100 MMHG | OXYGEN SATURATION: 99 % | HEART RATE: 91 BPM | SYSTOLIC BLOOD PRESSURE: 144 MMHG | BODY MASS INDEX: 39.02 KG/M2

## 2020-11-17 DIAGNOSIS — Z01.818 PRE-OP EXAMINATION: Primary | ICD-10-CM

## 2020-11-17 DIAGNOSIS — M20.41 HAMMERTOE OF RIGHT FOOT: ICD-10-CM

## 2020-11-17 DIAGNOSIS — I10 ESSENTIAL HYPERTENSION: ICD-10-CM

## 2020-11-17 DIAGNOSIS — M79.7 FIBROMYALGIA: ICD-10-CM

## 2020-11-17 PROBLEM — K76.0 FATTY LIVER: Status: RESOLVED | Noted: 2020-03-16 | Resolved: 2020-11-17

## 2020-11-17 PROBLEM — R79.89 ELEVATED LFTS: Status: RESOLVED | Noted: 2020-02-12 | Resolved: 2020-11-17

## 2020-11-17 PROBLEM — D64.9 ANEMIA: Status: RESOLVED | Noted: 2020-02-26 | Resolved: 2020-11-17

## 2020-11-17 PROBLEM — M54.32 SCIATICA, LEFT SIDE: Status: RESOLVED | Noted: 2019-08-01 | Resolved: 2020-11-17

## 2020-11-17 PROBLEM — R74.8 ELEVATED LIVER ENZYMES: Status: RESOLVED | Noted: 2020-02-26 | Resolved: 2020-11-17

## 2020-11-17 PROBLEM — R10.11 RIGHT UPPER QUADRANT ABDOMINAL PAIN: Status: RESOLVED | Noted: 2020-02-26 | Resolved: 2020-11-17

## 2020-11-17 PROBLEM — Z12.11 COLON CANCER SCREENING: Status: RESOLVED | Noted: 2020-02-26 | Resolved: 2020-11-17

## 2020-11-17 PROBLEM — R10.9 ACUTE FLANK PAIN: Status: RESOLVED | Noted: 2020-02-11 | Resolved: 2020-11-17

## 2020-11-17 PROBLEM — Z91.09 ENVIRONMENTAL ALLERGIES: Status: RESOLVED | Noted: 2018-04-18 | Resolved: 2020-11-17

## 2020-11-17 PROBLEM — D72.829 LEUKOCYTOSIS: Status: RESOLVED | Noted: 2020-02-11 | Resolved: 2020-11-17

## 2020-11-17 PROCEDURE — 3080F DIAST BP >= 90 MM HG: CPT | Performed by: INTERNAL MEDICINE

## 2020-11-17 PROCEDURE — 3008F BODY MASS INDEX DOCD: CPT | Performed by: INTERNAL MEDICINE

## 2020-11-17 PROCEDURE — 4004F PT TOBACCO SCREEN RCVD TLK: CPT | Performed by: INTERNAL MEDICINE

## 2020-11-17 PROCEDURE — 3725F SCREEN DEPRESSION PERFORMED: CPT | Performed by: INTERNAL MEDICINE

## 2020-11-17 PROCEDURE — 99243 OFF/OP CNSLTJ NEW/EST LOW 30: CPT | Performed by: INTERNAL MEDICINE

## 2020-11-17 PROCEDURE — 3077F SYST BP >= 140 MM HG: CPT | Performed by: INTERNAL MEDICINE

## 2020-11-17 RX ORDER — LISINOPRIL 20 MG/1
20 TABLET ORAL 2 TIMES DAILY
Qty: 180 TABLET | Refills: 1 | Status: SHIPPED | OUTPATIENT
Start: 2020-11-17 | End: 2021-02-23 | Stop reason: SDUPTHER

## 2020-12-02 DIAGNOSIS — Z72.0 TOBACCO ABUSE: Chronic | ICD-10-CM

## 2021-01-11 DIAGNOSIS — Z00.00 HEALTH CARE MAINTENANCE: ICD-10-CM

## 2021-01-11 RX ORDER — METOPROLOL SUCCINATE 50 MG/1
50 TABLET, EXTENDED RELEASE ORAL DAILY
Qty: 90 TABLET | Refills: 1 | Status: SHIPPED | OUTPATIENT
Start: 2021-01-11 | End: 2021-02-23 | Stop reason: SDUPTHER

## 2021-02-23 DIAGNOSIS — Z88.9 H/O MULTIPLE ALLERGIES: ICD-10-CM

## 2021-02-23 DIAGNOSIS — I10 ESSENTIAL HYPERTENSION: ICD-10-CM

## 2021-02-23 DIAGNOSIS — Z00.00 HEALTH CARE MAINTENANCE: ICD-10-CM

## 2021-02-23 DIAGNOSIS — M79.7 FIBROMYALGIA: ICD-10-CM

## 2021-02-23 RX ORDER — DULOXETIN HYDROCHLORIDE 60 MG/1
60 CAPSULE, DELAYED RELEASE ORAL DAILY
Qty: 90 CAPSULE | Refills: 3 | Status: SHIPPED | OUTPATIENT
Start: 2021-02-23 | End: 2022-03-01

## 2021-02-23 RX ORDER — METOPROLOL SUCCINATE 50 MG/1
50 TABLET, EXTENDED RELEASE ORAL DAILY
Qty: 90 TABLET | Refills: 3 | Status: SHIPPED | OUTPATIENT
Start: 2021-02-23 | End: 2022-03-01

## 2021-02-23 RX ORDER — GABAPENTIN 300 MG/1
600 CAPSULE ORAL 2 TIMES DAILY
Qty: 360 CAPSULE | Refills: 0 | Status: SHIPPED | OUTPATIENT
Start: 2021-02-23

## 2021-02-23 RX ORDER — LISINOPRIL 20 MG/1
20 TABLET ORAL 2 TIMES DAILY
Qty: 180 TABLET | Refills: 3 | Status: SHIPPED | OUTPATIENT
Start: 2021-02-23 | End: 2021-03-10 | Stop reason: SDUPTHER

## 2021-02-23 RX ORDER — CELECOXIB 200 MG/1
200 CAPSULE ORAL 2 TIMES DAILY
Qty: 180 CAPSULE | Refills: 3 | Status: SHIPPED | OUTPATIENT
Start: 2021-02-23

## 2021-02-23 RX ORDER — MONTELUKAST SODIUM 10 MG/1
10 TABLET ORAL DAILY
Qty: 90 TABLET | Refills: 3 | Status: SHIPPED | OUTPATIENT
Start: 2021-02-23

## 2021-03-10 DIAGNOSIS — I10 ESSENTIAL HYPERTENSION: ICD-10-CM

## 2021-03-10 RX ORDER — LISINOPRIL 40 MG/1
40 TABLET ORAL DAILY
Qty: 90 TABLET | Refills: 3 | Status: SHIPPED | OUTPATIENT
Start: 2021-03-10 | End: 2022-03-01

## 2022-02-24 DIAGNOSIS — Z00.00 HEALTH CARE MAINTENANCE: ICD-10-CM

## 2022-02-24 DIAGNOSIS — M79.7 FIBROMYALGIA: ICD-10-CM

## 2022-02-24 DIAGNOSIS — I10 ESSENTIAL HYPERTENSION: ICD-10-CM

## 2022-03-01 RX ORDER — METOPROLOL SUCCINATE 50 MG/1
50 TABLET, EXTENDED RELEASE ORAL DAILY
Qty: 90 TABLET | Refills: 3 | Status: SHIPPED | OUTPATIENT
Start: 2022-03-01

## 2022-03-01 RX ORDER — LISINOPRIL 40 MG/1
40 TABLET ORAL DAILY
Qty: 90 TABLET | Refills: 3 | Status: SHIPPED | OUTPATIENT
Start: 2022-03-01 | End: 2022-05-30

## 2022-03-01 RX ORDER — DULOXETIN HYDROCHLORIDE 60 MG/1
60 CAPSULE, DELAYED RELEASE ORAL DAILY
Qty: 90 CAPSULE | Refills: 3 | Status: SHIPPED | OUTPATIENT
Start: 2022-03-01

## 2022-07-16 ENCOUNTER — APPOINTMENT (OUTPATIENT)
Dept: LAB | Facility: HOSPITAL | Age: 52
End: 2022-07-16
Payer: COMMERCIAL

## 2022-07-16 DIAGNOSIS — Z11.4 SCREENING FOR HIV (HUMAN IMMUNODEFICIENCY VIRUS): ICD-10-CM

## 2022-07-16 PROCEDURE — 36415 COLL VENOUS BLD VENIPUNCTURE: CPT

## 2022-07-16 PROCEDURE — 87389 HIV-1 AG W/HIV-1&-2 AB AG IA: CPT

## 2022-07-17 LAB — HIV 1+2 AB+HIV1 P24 AG SERPL QL IA: NORMAL

## 2023-08-29 ENCOUNTER — TELEPHONE (OUTPATIENT)
Dept: FAMILY MEDICINE CLINIC | Facility: HOSPITAL | Age: 53
End: 2023-08-29

## 2023-08-29 NOTE — TELEPHONE ENCOUNTER
Patient transferred to [facility]. Visits in [address] [place] [address]. Please remove Dr Kevin Moss as PCP.

## 2023-09-01 NOTE — TELEPHONE ENCOUNTER
08/31/23 9:50 PM     The office's request has been received, reviewed, and the patient chart updated. The PCP has successfully been removed with a patient attribution note. This message will now be completed.     Thank you  Doris Freire

## (undated) DEVICE — 3000CC GUARDIAN II: Brand: GUARDIAN

## (undated) DEVICE — TUBING SUCTION 5MM X 12 FT

## (undated) DEVICE — BETHLEHEM UNIVERSAL OUTPATIENT: Brand: CARDINAL HEALTH

## (undated) DEVICE — NEURO PATTIES 1/2 X 1 1/2

## (undated) DEVICE — CONMED SCOPE SAVER BITE BLOCK, 20X27 MM: Brand: SCOPE SAVER

## (undated) DEVICE — GLOVE SRG BIOGEL 7.5

## (undated) DEVICE — NEEDLE 18 G X 1 1/2